# Patient Record
Sex: MALE | Race: WHITE | Employment: OTHER | URBAN - METROPOLITAN AREA
[De-identification: names, ages, dates, MRNs, and addresses within clinical notes are randomized per-mention and may not be internally consistent; named-entity substitution may affect disease eponyms.]

---

## 2021-06-20 ENCOUNTER — HOSPITAL ENCOUNTER (INPATIENT)
Age: 66
LOS: 2 days | Discharge: HOME HEALTH | DRG: 494 | End: 2021-06-22
Attending: EMERGENCY MEDICINE | Admitting: ORTHOPAEDIC SURGERY
Payer: COMMERCIAL

## 2021-06-20 ENCOUNTER — APPOINTMENT (OUTPATIENT)
Dept: GENERAL RADIOLOGY | Age: 66
DRG: 494 | End: 2021-06-20
Payer: COMMERCIAL

## 2021-06-20 ENCOUNTER — APPOINTMENT (OUTPATIENT)
Dept: CT IMAGING | Age: 66
DRG: 494 | End: 2021-06-20
Payer: COMMERCIAL

## 2021-06-20 DIAGNOSIS — S82.201A CLOSED FRACTURE OF RIGHT TIBIA AND FIBULA, INITIAL ENCOUNTER: Primary | ICD-10-CM

## 2021-06-20 DIAGNOSIS — S82.401A CLOSED FRACTURE OF RIGHT TIBIA AND FIBULA, INITIAL ENCOUNTER: Primary | ICD-10-CM

## 2021-06-20 LAB
ABO/RH: NORMAL
ANION GAP SERPL CALCULATED.3IONS-SCNC: 12 MMOL/L (ref 3–16)
ANTIBODY SCREEN: NORMAL
BASOPHILS ABSOLUTE: 0 K/UL (ref 0–0.2)
BASOPHILS RELATIVE PERCENT: 0.2 %
BUN BLDV-MCNC: 18 MG/DL (ref 7–20)
CALCIUM SERPL-MCNC: 10.1 MG/DL (ref 8.3–10.6)
CHLORIDE BLD-SCNC: 104 MMOL/L (ref 99–110)
CO2: 23 MMOL/L (ref 21–32)
CREAT SERPL-MCNC: 1 MG/DL (ref 0.8–1.3)
EOSINOPHILS ABSOLUTE: 0.1 K/UL (ref 0–0.6)
EOSINOPHILS RELATIVE PERCENT: 0.7 %
GFR AFRICAN AMERICAN: >60
GFR NON-AFRICAN AMERICAN: >60
GLUCOSE BLD-MCNC: 97 MG/DL (ref 70–99)
HCT VFR BLD CALC: 51.7 % (ref 40.5–52.5)
HEMOGLOBIN: 17.4 G/DL (ref 13.5–17.5)
LYMPHOCYTES ABSOLUTE: 1.3 K/UL (ref 1–5.1)
LYMPHOCYTES RELATIVE PERCENT: 12.2 %
MCH RBC QN AUTO: 31 PG (ref 26–34)
MCHC RBC AUTO-ENTMCNC: 33.7 G/DL (ref 31–36)
MCV RBC AUTO: 91.9 FL (ref 80–100)
MONOCYTES ABSOLUTE: 0.7 K/UL (ref 0–1.3)
MONOCYTES RELATIVE PERCENT: 6.6 %
NEUTROPHILS ABSOLUTE: 8.3 K/UL (ref 1.7–7.7)
NEUTROPHILS RELATIVE PERCENT: 80.3 %
PDW BLD-RTO: 12.6 % (ref 12.4–15.4)
PLATELET # BLD: 199 K/UL (ref 135–450)
PMV BLD AUTO: 9 FL (ref 5–10.5)
POTASSIUM REFLEX MAGNESIUM: 4 MMOL/L (ref 3.5–5.1)
RBC # BLD: 5.62 M/UL (ref 4.2–5.9)
SARS-COV-2, NAAT: NOT DETECTED
SODIUM BLD-SCNC: 139 MMOL/L (ref 136–145)
WBC # BLD: 10.3 K/UL (ref 4–11)

## 2021-06-20 PROCEDURE — 87635 SARS-COV-2 COVID-19 AMP PRB: CPT

## 2021-06-20 PROCEDURE — 6360000002 HC RX W HCPCS: Performed by: PHYSICIAN ASSISTANT

## 2021-06-20 PROCEDURE — 1200000000 HC SEMI PRIVATE

## 2021-06-20 PROCEDURE — 6370000000 HC RX 637 (ALT 250 FOR IP): Performed by: ORTHOPAEDIC SURGERY

## 2021-06-20 PROCEDURE — 99285 EMERGENCY DEPT VISIT HI MDM: CPT

## 2021-06-20 PROCEDURE — 6360000002 HC RX W HCPCS: Performed by: ORTHOPAEDIC SURGERY

## 2021-06-20 PROCEDURE — 99222 1ST HOSP IP/OBS MODERATE 55: CPT | Performed by: ORTHOPAEDIC SURGERY

## 2021-06-20 PROCEDURE — 86850 RBC ANTIBODY SCREEN: CPT

## 2021-06-20 PROCEDURE — 2580000003 HC RX 258: Performed by: ORTHOPAEDIC SURGERY

## 2021-06-20 PROCEDURE — 86901 BLOOD TYPING SEROLOGIC RH(D): CPT

## 2021-06-20 PROCEDURE — 80048 BASIC METABOLIC PNL TOTAL CA: CPT

## 2021-06-20 PROCEDURE — 36415 COLL VENOUS BLD VENIPUNCTURE: CPT

## 2021-06-20 PROCEDURE — 73700 CT LOWER EXTREMITY W/O DYE: CPT

## 2021-06-20 PROCEDURE — 86900 BLOOD TYPING SEROLOGIC ABO: CPT

## 2021-06-20 PROCEDURE — 85025 COMPLETE CBC W/AUTO DIFF WBC: CPT

## 2021-06-20 PROCEDURE — 93005 ELECTROCARDIOGRAM TRACING: CPT | Performed by: PHYSICIAN ASSISTANT

## 2021-06-20 PROCEDURE — 73590 X-RAY EXAM OF LOWER LEG: CPT

## 2021-06-20 RX ORDER — SODIUM CHLORIDE 0.9 % (FLUSH) 0.9 %
5-40 SYRINGE (ML) INJECTION EVERY 12 HOURS SCHEDULED
Status: DISCONTINUED | OUTPATIENT
Start: 2021-06-20 | End: 2021-06-21

## 2021-06-20 RX ORDER — ONDANSETRON 2 MG/ML
4 INJECTION INTRAMUSCULAR; INTRAVENOUS EVERY 6 HOURS PRN
Status: DISCONTINUED | OUTPATIENT
Start: 2021-06-20 | End: 2021-06-22 | Stop reason: HOSPADM

## 2021-06-20 RX ORDER — MORPHINE SULFATE 2 MG/ML
2 INJECTION, SOLUTION INTRAMUSCULAR; INTRAVENOUS
Status: DISCONTINUED | OUTPATIENT
Start: 2021-06-20 | End: 2021-06-21

## 2021-06-20 RX ORDER — OXYCODONE HYDROCHLORIDE 10 MG/1
10 TABLET ORAL EVERY 4 HOURS PRN
Status: DISCONTINUED | OUTPATIENT
Start: 2021-06-20 | End: 2021-06-22 | Stop reason: HOSPADM

## 2021-06-20 RX ORDER — POLYETHYLENE GLYCOL 3350 17 G/17G
17 POWDER, FOR SOLUTION ORAL DAILY PRN
Status: DISCONTINUED | OUTPATIENT
Start: 2021-06-20 | End: 2021-06-22 | Stop reason: HOSPADM

## 2021-06-20 RX ORDER — MORPHINE SULFATE 4 MG/ML
4 INJECTION, SOLUTION INTRAMUSCULAR; INTRAVENOUS
Status: DISCONTINUED | OUTPATIENT
Start: 2021-06-20 | End: 2021-06-22 | Stop reason: HOSPADM

## 2021-06-20 RX ORDER — SODIUM CHLORIDE 9 MG/ML
25 INJECTION, SOLUTION INTRAVENOUS PRN
Status: DISCONTINUED | OUTPATIENT
Start: 2021-06-20 | End: 2021-06-21

## 2021-06-20 RX ORDER — SODIUM CHLORIDE 0.9 % (FLUSH) 0.9 %
5-40 SYRINGE (ML) INJECTION PRN
Status: DISCONTINUED | OUTPATIENT
Start: 2021-06-20 | End: 2021-06-21

## 2021-06-20 RX ORDER — OXYCODONE HYDROCHLORIDE 5 MG/1
5 TABLET ORAL EVERY 4 HOURS PRN
Status: DISCONTINUED | OUTPATIENT
Start: 2021-06-20 | End: 2021-06-22 | Stop reason: HOSPADM

## 2021-06-20 RX ORDER — ACETAMINOPHEN 325 MG/1
650 TABLET ORAL EVERY 6 HOURS
Status: DISCONTINUED | OUTPATIENT
Start: 2021-06-20 | End: 2021-06-22 | Stop reason: HOSPADM

## 2021-06-20 RX ORDER — ONDANSETRON 4 MG/1
4 TABLET, ORALLY DISINTEGRATING ORAL EVERY 8 HOURS PRN
Status: DISCONTINUED | OUTPATIENT
Start: 2021-06-20 | End: 2021-06-22 | Stop reason: HOSPADM

## 2021-06-20 RX ADMIN — OXYCODONE HYDROCHLORIDE 10 MG: 10 TABLET ORAL at 16:15

## 2021-06-20 RX ADMIN — HYDROMORPHONE HYDROCHLORIDE 0.5 MG: 1 INJECTION, SOLUTION INTRAMUSCULAR; INTRAVENOUS; SUBCUTANEOUS at 14:55

## 2021-06-20 RX ADMIN — Medication 10 ML: at 23:07

## 2021-06-20 RX ADMIN — ACETAMINOPHEN 650 MG: 325 TABLET ORAL at 16:15

## 2021-06-20 RX ADMIN — ENOXAPARIN SODIUM 40 MG: 40 INJECTION SUBCUTANEOUS at 18:43

## 2021-06-20 RX ADMIN — MORPHINE SULFATE 4 MG: 4 INJECTION INTRAVENOUS at 18:47

## 2021-06-20 RX ADMIN — ACETAMINOPHEN 650 MG: 325 TABLET ORAL at 23:05

## 2021-06-20 ASSESSMENT — PAIN SCALES - GENERAL
PAINLEVEL_OUTOF10: 0
PAINLEVEL_OUTOF10: 4
PAINLEVEL_OUTOF10: 7
PAINLEVEL_OUTOF10: 7
PAINLEVEL_OUTOF10: 4
PAINLEVEL_OUTOF10: 7
PAINLEVEL_OUTOF10: 2
PAINLEVEL_OUTOF10: 4
PAINLEVEL_OUTOF10: 3

## 2021-06-20 ASSESSMENT — PAIN DESCRIPTION - DESCRIPTORS
DESCRIPTORS: ACHING
DESCRIPTORS: THROBBING;ACHING
DESCRIPTORS: ACHING
DESCRIPTORS: THROBBING;ACHING
DESCRIPTORS: ACHING;THROBBING

## 2021-06-20 ASSESSMENT — ENCOUNTER SYMPTOMS
SHORTNESS OF BREATH: 0
COUGH: 0
NAUSEA: 0
COLOR CHANGE: 0
VOMITING: 0

## 2021-06-20 ASSESSMENT — PAIN DESCRIPTION - FREQUENCY
FREQUENCY: CONTINUOUS

## 2021-06-20 ASSESSMENT — PAIN DESCRIPTION - PAIN TYPE
TYPE: ACUTE PAIN

## 2021-06-20 ASSESSMENT — PAIN DESCRIPTION - ORIENTATION
ORIENTATION: RIGHT

## 2021-06-20 ASSESSMENT — PAIN - FUNCTIONAL ASSESSMENT
PAIN_FUNCTIONAL_ASSESSMENT: PREVENTS OR INTERFERES SOME ACTIVE ACTIVITIES AND ADLS

## 2021-06-20 ASSESSMENT — PAIN DESCRIPTION - ONSET
ONSET: ON-GOING
ONSET: GRADUAL

## 2021-06-20 ASSESSMENT — PAIN DESCRIPTION - LOCATION
LOCATION: LEG

## 2021-06-20 ASSESSMENT — PAIN DESCRIPTION - PROGRESSION
CLINICAL_PROGRESSION: GRADUALLY WORSENING
CLINICAL_PROGRESSION: NOT CHANGED
CLINICAL_PROGRESSION: GRADUALLY IMPROVING
CLINICAL_PROGRESSION: NOT CHANGED
CLINICAL_PROGRESSION: GRADUALLY IMPROVING

## 2021-06-20 NOTE — ED NOTES
Bed: A15  Expected date: 6/20/21  Expected time: 9:30 AM  Means of arrival: [facility]  Comments:  66M fall, poss tib/fib fx     Sil Mccloud, RN  06/20/21 7761

## 2021-06-20 NOTE — ED PROVIDER NOTES
Pt Name: Denise Mei  MRN: [id number]  [place] 1955  Date of evaluation: 6/20/2021    EKG Interpretation    The purpose of this note is for preliminary EKG interpretation only. This patient was not seen by this provider. EKG had been ordered as part of a preoperative work-up and was handed to me for evaluation. EKG visualized preliminarily interpreted by myself shows sinus rhythm at a rate of 66 with a normal axis of 4. ST-T waves intervals all within normal limits.   Normal cardiogram.       Taty Burrows MD  06/20/21 8628

## 2021-06-20 NOTE — ED NOTES
ED SBAR report provider to [facility]. Patient to be transported to Room 3115 via stretcher by transport tech. Patient transported with bedside cardiac monitor and with IV medications infusing. IV site clean, dry, and intact. MEWS score and pain assessed as 2/10 and documented. Updated patient on plan of care.      Frederick Morrow RN  06/20/21 5782

## 2021-06-20 NOTE — ED PROVIDER NOTES
[address] [facility]        Pt Name: Kenny Joshua  MRN: [id number]  [place] 1955  Date of evaluation: 6/20/2021  Provider: ELLIOT Good  PCP: Myles Rojas  Note Started: 10:25 AM EDT       ISABELA. I have evaluated this patient. My supervising physician was available for consultation. CHIEF COMPLAINT       Chief Complaint   Patient presents with    Leg Injury     right lower leg, demority noted, slipped down a hill        HISTORY OF PRESENT ILLNESS   (Location, Timing/Onset, Context/Setting, Quality, Duration, Modifying Factors, Severity, Associated Signs and Symptoms)  Note limiting factors. Kenny Joshua is a 61 y.o. male who presents with a Chief Complaint of right leg pain. The patient was on a muddy slope, slipped and fell. He felt a crack in his right lower leg and was unable to walk due to pain. He has no numbness or tingling. He has no other injuries. He has no further complaints at this time. Nursing Notes were all reviewed and agreed with or any disagreements were addressed in the HPI. REVIEW OF SYSTEMS    (2-9 systems for level 4, 10 or more for level 5)     Review of Systems   Constitutional: Negative for chills and fever. Respiratory: Negative for cough and shortness of breath. Cardiovascular: Negative for chest pain and palpitations. Gastrointestinal: Negative for nausea and vomiting. Musculoskeletal: Positive for arthralgias and myalgias. Negative for neck pain and neck stiffness. Skin: Negative for color change and wound. Neurological: Negative for dizziness, syncope, weakness and headaches. Positives and Pertinent negatives as per HPI. Except as noted above in the ROS, all other systems were reviewed and negative. PAST MEDICAL HISTORY     Past Medical History:   Diagnosis Date    Hyperlipidemia     Hypertension          SURGICAL HISTORY   History reviewed.  No pertinent surgical history. CURRENTMEDICATIONS       Previous Medications    No medications on file         ALLERGIES     Patient has no known allergies. FAMILYHISTORY     History reviewed. No pertinent family history. SOCIAL HISTORY       Social History     Tobacco Use    Smoking status: Never Smoker    Smokeless tobacco: Never Used   Substance Use Topics    Alcohol use: Not Currently    Drug use: Never       SCREENINGS    Juan Coma Scale  Eye Opening: Spontaneous  Best Verbal Response: Oriented  Best Motor Response: Obeys commands  [place] Coma Scale Score: 15        PHYSICAL EXAM    (up to 7 for level 4, 8 or more for level 5)     ED Triage Vitals [06/20/21 0949]   BP Temp Temp Source Pulse Resp SpO2 Height Weight   126/83 98 °F (36.7 °C) Oral 71 17 100 % -- 204 lb 9.4 oz (92.8 kg)       Physical Exam  Vitals and nursing note reviewed. Constitutional:       General: He is not in acute distress. Appearance: Normal appearance. He is well-developed. He is not ill-appearing, toxic-appearing or diaphoretic. HENT:      Head: Normocephalic and atraumatic. Eyes:      Conjunctiva/sclera: Conjunctivae normal.      Pupils: Pupils are equal, round, and reactive to light. Cardiovascular:      Rate and Rhythm: Normal rate and regular rhythm. Pulses:           Dorsalis pedis pulses are 2+ on the right side and 2+ on the left side. Pulmonary:      Effort: Pulmonary effort is normal. No respiratory distress. Breath sounds: Normal breath sounds. Musculoskeletal:      Right lower leg: Swelling, deformity, tenderness and bony tenderness present. Left lower leg: Normal.      Right foot: Normal capillary refill. No swelling, deformity or tenderness. Left foot: Normal capillary refill. No swelling, deformity or tenderness. Comments: NVS intact distally   Skin:     General: Skin is warm and dry. Neurological:      General: No focal deficit present.       Mental Status: He is alert and oriented to person, place, and time. Psychiatric:         Behavior: Behavior normal. Behavior is cooperative. Thought Content: Thought content normal.         DIAGNOSTIC RESULTS   LABS:    Labs Reviewed   CBC WITH AUTO DIFFERENTIAL - Abnormal; Notable for the following components:       Result Value    Neutrophils Absolute 8.3 (*)     All other components within normal limits    Narrative:     Performed at:  [address] [address]   Phone [phone] [place]    Narrative:     Performed at:  [address] [address]   Phone [phone]   BASIC METABOLIC PANEL W/ REFLEX TO MG FOR LOW K    Narrative:     Performed at:  [address] [address]   Phone [phone]   TYPE AND SCREEN    Narrative:     Performed at:  [address] [address]   Phone [phone]       All other labs were within normal range or not returned as of this dictation. EKG: All EKG's are interpreted by the Emergency Department Physician in the absence of a cardiologist.  Please see their note for interpretation of EKG. RADIOLOGY:   Non-plain film images such as CT, Ultrasound and MRI are read by the radiologist. Plain radiographic images are visualized and preliminarily interpreted by the ED Provider with the below findings:    Interpretation per the Radiologist below, if available at the time of this note:    CT ANKLE RIGHT WO CONTRAST   Final Result   1. Acute comminuted fracture of the distal tibia centered at the distal   diaphysis with distal extension to the articular surface of the tibial   plafond and through the posterior malleolus of the tibia. The posterior   malleolus is mildly displaced. 2. Acute and comminuted fracture of the lateral malleolus. 3. Mild lateral subluxation of the talus in relation to the tibial plafond   with resultant widening of the medial clear space compatible with underlying   ligamentous injury. XR TIBIA FIBULA RIGHT (2 VIEWS)   Final Result   Oblique mildly displaced fracture distal tibial diaphysis      Probable oblique fracture distal fibula           No results found. PROCEDURES   Unless otherwise noted below, none     Procedures    CRITICAL CARE TIME   N/A    CONSULTS:  IP CONSULT TO ORTHOPEDIC SURGERY  IP CONSULT TO HOSPITALIST      EMERGENCY DEPARTMENT COURSE and DIFFERENTIAL DIAGNOSIS/MDM:   Vitals:    Vitals:    06/20/21 1431 06/20/21 1446 06/20/21 1516 06/20/21 1540   BP: 129/85 121/74 123/73 117/79   Pulse:    71   Resp:    16   Temp:    98.2 °F (36.8 °C)   TempSrc:    Oral   SpO2: 97% 100% 98% 97%   Weight:           Patient was given the following medications:  Medications   HYDROmorphone (DILAUDID) injection 0.5 mg (0.5 mg Intravenous Given 6/20/21 1455)           ED COURSE & MEDICAL DECISION MAKING    - The patient presented to the ER with complaints of right leg injury. Vital signs were reviewed. Exam with WDWN male in no apparent distress. Peripheral IV placed. Labs, Imaging ordered. - Pertinent Labs & Imaging studies reviewed. (See chart for details)   -  Patient seen and evaluated in the emergency department. -  Triage and nursing notes reviewed and incorporated. -  Old chart records reviewed and incorporated. -  ISABELA. I have evaluated this patient. My supervising physician was available for consultation.  -  Differential diagnosis includes: abrasion/laceration, contusion, fracture, sprain/strain, dislocation  -  Work-up included:  See above  -  ED treatment included:  dilaudid offered on arrival - pt declined and advised his pain was well controlled. Later, after splint was placed he was more uncomfortable so a dose of dilaudid was given.   - Consults: Orthopedics - I spoke with Dr Trinity Dukes who advised admission for surgery. I consulted the hospitalist, Dr. Kemi Goodson, who advised that given that this patient does not have any significant medical history, so from an internal medicine standpoint there is nothing for them to manage from an inpatient standpoint since the only issue is orthopedic, and asked for me to re-consult ortho for them to place admission orders. I then reached back out to orthopedics, who did place admission orders.  -  Results discussed with patient and/or family. Imaging studies show an oblique mildly displaced fracture of the distal tibial diaphysis and a probable oblique fracture of the distal fibula. Patient feels that his pain is well controlled, and is agreeable with the plan for admission, surgery. At this time, we recommend admission, as the patient requires surgical intervention. The patient and/or family is agreeable with plan of care and disposition.  -  A lower leg splint was placed by the emergency department technician, it was applied appropriately and the patient was neurovascularly intact as observed by myself. -  Disposition:   Admission  - Critical Care: 0 minutes      FINAL IMPRESSION      1. Closed fracture of right tibia and fibula, initial encounter          DISPOSITION/PLAN   DISPOSITION Admitted 06/20/2021 02:27:45 PM      PATIENT REFERRED TO:  No follow-up provider specified.     DISCHARGE MEDICATIONS:  New Prescriptions    No medications on file       DISCONTINUED MEDICATIONS:  Discontinued Medications    No medications on file              (Please note that portions of this note were completed with a voice recognition program.  Efforts were made to edit the dictations but occasionally words are mis-transcribed.)    ELLIOT Hill (electronically signed)           Sisi Cantu, [address]  06/20/21 [address] [place], [address]  06/20/21 3217

## 2021-06-20 NOTE — PROGRESS NOTES
Checking on patient Q2H for nutrition needs, hygiene needs, comfort measures, mobility, fall risk interventions, and safe environment. All precautions and interventions in place. Educated patient on use of call light and telephone. Patient verbalizes understanding. Call light/telephone in reach.   Electronically signed by Allan Stone RN on 6/20/2021 at 4:57 PM

## 2021-06-20 NOTE — PLAN OF CARE
Problem: Pain:  Goal: Pain level will decrease  Description: Pain level will decrease  Outcome: Ongoing  Note: Pain /discomfort being managed with PRN analgesics per MD orders. Patient able to express presence and absence of pain and rate pain appropriately using numerical scale. Goal: Control of acute pain  Description: Control of acute pain  Outcome: Ongoing  Goal: Control of chronic pain  Description: Control of chronic pain  Outcome: Ongoing     Problem: Falls - Risk of:  Goal: Will remain free from falls  Description: Will remain free from falls  Outcome: Ongoing  Note: Fall risk assessment completed . Fall precautions in place, bed alarm on, side rails 2/4 up, call light in reach, educated pt on calling for assistance when needed, room clear of clutter. Pt verbalized understanding.

## 2021-06-20 NOTE — H&P
Department of Orthopedic Surgery  History and Physical      CHIEF COMPLAINT: Right leg injury    Reason for Admission: Right tibia and ankle fractures    History Obtained From:  patient    HISTORY OF PRESENT ILLNESS:      The patient is a 61 y.o. male with no significant past medical history who presents today with a right lower leg injury. He was on a bed when your river and slid down some wet mud. His leg gave out under him and he heard a snap. He had immediate onset of pain and inability to bear weight. He denies other injuries. He denies prior history of injury or surgery to the right lower extremity. He is otherwise healthy and denies history of blood clots, diabetes. He is visiting from [place], [place]. He is a retired . Past Medical History: No significant history  Past Surgical History: No significant history    Medications Prior to Admission:   Not in a hospital admission. Allergies:  Patient has no known allergies. REVIEW OF SYSTEMS:  CONSTITUTIONAL:  negative  RESPIRATORY:  negative  CARDIOVASCULAR:  negative  MUSCULOSKELETAL:  positive for right leg pain  NEUROLOGICAL:  negative    PHYSICAL EXAM:  VITALS:  /79   Pulse 71   Temp 98.2 °F (36.8 °C) (Oral)   Resp 16   Wt 204 lb 9.4 oz (92.8 kg)   SpO2 97%     Appearance: lying in hospital bed, appears to be in no acute distress, awake and alert  Resp: unlabored breathing on room air  Skin: warm, dry and intact with out erythema or significant increased temperature  RLE: Below-knee splint in place. Sensation is intact to light touch to the exposed toes. He can actively flex and extend all of his toes. Brisk capillary refill in his toes. Nontender about the knee. Radiographs:  Right tibia/fibula x-rays and CT scan of the ankle reviewed and are significant for a diaphyseal fracture of the tibia as well as a bimalleolar ankle fracture involving the lateral and posterior malleolus.     ASSESSMENT AND PLAN:  Right tibia fracture with right bimalleolar ankle fracture    We discussed the diagnosis and treatment options. I recommended surgical fixation of his tibia and ankle fractures. We went over the risks and complications of surgery including: bleeding, infection, decreased ROM, continued pain, instability, fracture, dislocation, neurovascular injury, post op cognitive disorder, DVT, pulmonary embolism and need for further surgical procedures. The patient understands these issues and we will see the patient in the operating room.      Plan for surgery Monday vs Tuesday  NPO after midnight  Pain control  NWB RLE  DVT ppx with Lovenox 40mg daily    Librado Caldwell MD  6/20/2021

## 2021-06-21 ENCOUNTER — ANESTHESIA EVENT (OUTPATIENT)
Dept: OPERATING ROOM | Age: 66
DRG: 494 | End: 2021-06-21
Payer: COMMERCIAL

## 2021-06-21 ENCOUNTER — ANESTHESIA (OUTPATIENT)
Dept: OPERATING ROOM | Age: 66
DRG: 494 | End: 2021-06-21
Payer: COMMERCIAL

## 2021-06-21 ENCOUNTER — APPOINTMENT (OUTPATIENT)
Dept: GENERAL RADIOLOGY | Age: 66
DRG: 494 | End: 2021-06-21
Payer: COMMERCIAL

## 2021-06-21 VITALS
TEMPERATURE: 98.4 F | SYSTOLIC BLOOD PRESSURE: 122 MMHG | OXYGEN SATURATION: 94 % | RESPIRATION RATE: 13 BRPM | DIASTOLIC BLOOD PRESSURE: 71 MMHG

## 2021-06-21 LAB
EKG ATRIAL RATE: 66 BPM
EKG DIAGNOSIS: NORMAL
EKG P AXIS: 58 DEGREES
EKG P-R INTERVAL: 172 MS
EKG Q-T INTERVAL: 392 MS
EKG QRS DURATION: 78 MS
EKG QTC CALCULATION (BAZETT): 410 MS
EKG R AXIS: 4 DEGREES
EKG T AXIS: 30 DEGREES
EKG VENTRICULAR RATE: 66 BPM

## 2021-06-21 PROCEDURE — 6360000002 HC RX W HCPCS: Performed by: ORTHOPAEDIC SURGERY

## 2021-06-21 PROCEDURE — 27827 TREAT LOWER LEG FRACTURE: CPT | Performed by: NURSE PRACTITIONER

## 2021-06-21 PROCEDURE — 27792 TREATMENT OF ANKLE FRACTURE: CPT | Performed by: ORTHOPAEDIC SURGERY

## 2021-06-21 PROCEDURE — 3700000001 HC ADD 15 MINUTES (ANESTHESIA): Performed by: ORTHOPAEDIC SURGERY

## 2021-06-21 PROCEDURE — 94150 VITAL CAPACITY TEST: CPT

## 2021-06-21 PROCEDURE — 6370000000 HC RX 637 (ALT 250 FOR IP): Performed by: ORTHOPAEDIC SURGERY

## 2021-06-21 PROCEDURE — 6360000002 HC RX W HCPCS: Performed by: ANESTHESIOLOGY

## 2021-06-21 PROCEDURE — 73590 X-RAY EXAM OF LOWER LEG: CPT

## 2021-06-21 PROCEDURE — 0QSJ04Z REPOSITION RIGHT FIBULA WITH INTERNAL FIXATION DEVICE, OPEN APPROACH: ICD-10-PCS | Performed by: ORTHOPAEDIC SURGERY

## 2021-06-21 PROCEDURE — 7100000000 HC PACU RECOVERY - FIRST 15 MIN: Performed by: ORTHOPAEDIC SURGERY

## 2021-06-21 PROCEDURE — 2720000010 HC SURG SUPPLY STERILE: Performed by: ORTHOPAEDIC SURGERY

## 2021-06-21 PROCEDURE — 7100000001 HC PACU RECOVERY - ADDTL 15 MIN: Performed by: ORTHOPAEDIC SURGERY

## 2021-06-21 PROCEDURE — 6360000002 HC RX W HCPCS: Performed by: NURSE ANESTHETIST, CERTIFIED REGISTERED

## 2021-06-21 PROCEDURE — 3600000014 HC SURGERY LEVEL 4 ADDTL 15MIN: Performed by: ORTHOPAEDIC SURGERY

## 2021-06-21 PROCEDURE — C1713 ANCHOR/SCREW BN/BN,TIS/BN: HCPCS | Performed by: ORTHOPAEDIC SURGERY

## 2021-06-21 PROCEDURE — 2580000003 HC RX 258: Performed by: ORTHOPAEDIC SURGERY

## 2021-06-21 PROCEDURE — 3209999900 FLUORO FOR SURGICAL PROCEDURES

## 2021-06-21 PROCEDURE — 2580000003 HC RX 258: Performed by: NURSE ANESTHETIST, CERTIFIED REGISTERED

## 2021-06-21 PROCEDURE — 0QSG04Z REPOSITION RIGHT TIBIA WITH INTERNAL FIXATION DEVICE, OPEN APPROACH: ICD-10-PCS | Performed by: ORTHOPAEDIC SURGERY

## 2021-06-21 PROCEDURE — 2709999900 HC NON-CHARGEABLE SUPPLY: Performed by: ORTHOPAEDIC SURGERY

## 2021-06-21 PROCEDURE — 2500000003 HC RX 250 WO HCPCS: Performed by: ORTHOPAEDIC SURGERY

## 2021-06-21 PROCEDURE — 3600000004 HC SURGERY LEVEL 4 BASE: Performed by: ORTHOPAEDIC SURGERY

## 2021-06-21 PROCEDURE — 93010 ELECTROCARDIOGRAM REPORT: CPT | Performed by: INTERNAL MEDICINE

## 2021-06-21 PROCEDURE — 3700000000 HC ANESTHESIA ATTENDED CARE: Performed by: ORTHOPAEDIC SURGERY

## 2021-06-21 PROCEDURE — 1200000000 HC SEMI PRIVATE

## 2021-06-21 PROCEDURE — 27827 TREAT LOWER LEG FRACTURE: CPT | Performed by: ORTHOPAEDIC SURGERY

## 2021-06-21 PROCEDURE — C1769 GUIDE WIRE: HCPCS | Performed by: ORTHOPAEDIC SURGERY

## 2021-06-21 PROCEDURE — 2500000003 HC RX 250 WO HCPCS: Performed by: NURSE ANESTHETIST, CERTIFIED REGISTERED

## 2021-06-21 DEVICE — SCREW BNE L16MM DIA2.7MM LNG CORT FT ANK S STL ST: Type: IMPLANTABLE DEVICE | Site: ANKLE | Status: FUNCTIONAL

## 2021-06-21 DEVICE — PLATE BNE L80MM 4 H R LAT DST PERIARTC FIBULAR S STL LOK: Type: IMPLANTABLE DEVICE | Site: ANKLE | Status: FUNCTIONAL

## 2021-06-21 DEVICE — SCREW BNE L30MM DIA3.5MM CORT ANK S STL NONLOCKING LO PROF: Type: IMPLANTABLE DEVICE | Site: TIBIA | Status: FUNCTIONAL

## 2021-06-21 DEVICE — SCREW BONE L32MM DIA2.7MM ANK S STL LCK LO PROF FOR FX MGMT: Type: IMPLANTABLE DEVICE | Site: TIBIA | Status: FUNCTIONAL

## 2021-06-21 DEVICE — SCREW BNE L14MM DIA3.5MM HD DIA2.7MM CORT PERIARTC S STL ST: Type: IMPLANTABLE DEVICE | Site: ANKLE | Status: FUNCTIONAL

## 2021-06-21 DEVICE — SCREW BONE L40MM DIA2.7MM CORT ANK S STL NONLOCKING LO PROF: Type: IMPLANTABLE DEVICE | Site: TIBIA | Status: FUNCTIONAL

## 2021-06-21 DEVICE — IMPLANTABLE DEVICE: Type: IMPLANTABLE DEVICE | Site: TIBIA | Status: FUNCTIONAL

## 2021-06-21 DEVICE — SCREW BONE L30MM DIA2.7MM S STL LCK LO PROF FOR ANK FX MGMT: Type: IMPLANTABLE DEVICE | Site: TIBIA | Status: FUNCTIONAL

## 2021-06-21 DEVICE — SCREW BNE L26MM DIA3.5MM CORT ANK S STL NONLOCKING LO PROF: Type: IMPLANTABLE DEVICE | Site: TIBIA | Status: FUNCTIONAL

## 2021-06-21 DEVICE — SCREW BNE L24MM DIA2.7MM SM HEX HD DIA2.5MM CORT S STL ST: Type: IMPLANTABLE DEVICE | Site: ANKLE | Status: FUNCTIONAL

## 2021-06-21 DEVICE — SCREW BONE L36MM DIA2.7MM S STL CORT ANK FT ST CANN LCK FULL: Type: IMPLANTABLE DEVICE | Site: TIBIA | Status: FUNCTIONAL

## 2021-06-21 DEVICE — SCREW BNE L18MM DIA3.5MM HD DIA2.7MM PERIARTC CORT S STL ST: Type: IMPLANTABLE DEVICE | Site: ANKLE | Status: FUNCTIONAL

## 2021-06-21 DEVICE — SCREW BNE L18MM DIA2.7MM HEX HD DIA2.5MM CANC BIODUR 108C: Type: IMPLANTABLE DEVICE | Site: ANKLE | Status: FUNCTIONAL

## 2021-06-21 DEVICE — SCREW BNE L24MM DIA3.5MM CORT ANK S STL NONLOCKING LO PROF: Type: IMPLANTABLE DEVICE | Site: TIBIA | Status: FUNCTIONAL

## 2021-06-21 DEVICE — SCREW BNE L14MM DIA2.7MM HEX HD DIA2.5MM CANC BIODUR 108C: Type: IMPLANTABLE DEVICE | Site: ANKLE | Status: FUNCTIONAL

## 2021-06-21 DEVICE — SCREW BNE L12MM DIA3.5MM HD DIA2.7MM CORT PERIARTC S STL ST: Type: IMPLANTABLE DEVICE | Site: ANKLE | Status: FUNCTIONAL

## 2021-06-21 DEVICE — SCREW BNE L16MM DIA2.7MM HEX HD DIA2.5MM CANC BIODUR 108C: Type: IMPLANTABLE DEVICE | Site: ANKLE | Status: FUNCTIONAL

## 2021-06-21 DEVICE — SCREW BONE L38MM DIA2.7MM S STL CORT ANK FT ST CANN LCK FULL: Type: IMPLANTABLE DEVICE | Site: TIBIA | Status: FUNCTIONAL

## 2021-06-21 RX ORDER — DEXAMETHASONE SODIUM PHOSPHATE 4 MG/ML
INJECTION, SOLUTION INTRA-ARTICULAR; INTRALESIONAL; INTRAMUSCULAR; INTRAVENOUS; SOFT TISSUE PRN
Status: DISCONTINUED | OUTPATIENT
Start: 2021-06-21 | End: 2021-06-21 | Stop reason: SDUPTHER

## 2021-06-21 RX ORDER — SODIUM CHLORIDE 450 MG/100ML
INJECTION, SOLUTION INTRAVENOUS CONTINUOUS
Status: DISCONTINUED | OUTPATIENT
Start: 2021-06-21 | End: 2021-06-22 | Stop reason: HOSPADM

## 2021-06-21 RX ORDER — ATORVASTATIN CALCIUM 20 MG/1
20 TABLET, FILM COATED ORAL DAILY
COMMUNITY

## 2021-06-21 RX ORDER — PROPOFOL 10 MG/ML
INJECTION, EMULSION INTRAVENOUS PRN
Status: DISCONTINUED | OUTPATIENT
Start: 2021-06-21 | End: 2021-06-21 | Stop reason: SDUPTHER

## 2021-06-21 RX ORDER — SENNA AND DOCUSATE SODIUM 50; 8.6 MG/1; MG/1
1 TABLET, FILM COATED ORAL 2 TIMES DAILY
Status: DISCONTINUED | OUTPATIENT
Start: 2021-06-21 | End: 2021-06-22 | Stop reason: HOSPADM

## 2021-06-21 RX ORDER — FENTANYL CITRATE 50 UG/ML
50 INJECTION, SOLUTION INTRAMUSCULAR; INTRAVENOUS EVERY 5 MIN PRN
Status: DISCONTINUED | OUTPATIENT
Start: 2021-06-21 | End: 2021-06-21 | Stop reason: HOSPADM

## 2021-06-21 RX ORDER — SODIUM CHLORIDE 0.9 % (FLUSH) 0.9 %
5-40 SYRINGE (ML) INJECTION PRN
Status: DISCONTINUED | OUTPATIENT
Start: 2021-06-21 | End: 2021-06-22 | Stop reason: HOSPADM

## 2021-06-21 RX ORDER — FENTANYL CITRATE 50 UG/ML
INJECTION, SOLUTION INTRAMUSCULAR; INTRAVENOUS PRN
Status: DISCONTINUED | OUTPATIENT
Start: 2021-06-21 | End: 2021-06-21 | Stop reason: SDUPTHER

## 2021-06-21 RX ORDER — LIDOCAINE HYDROCHLORIDE 20 MG/ML
INJECTION, SOLUTION EPIDURAL; INFILTRATION; INTRACAUDAL; PERINEURAL PRN
Status: DISCONTINUED | OUTPATIENT
Start: 2021-06-21 | End: 2021-06-21 | Stop reason: SDUPTHER

## 2021-06-21 RX ORDER — HYDRALAZINE HYDROCHLORIDE 20 MG/ML
5 INJECTION INTRAMUSCULAR; INTRAVENOUS EVERY 10 MIN PRN
Status: DISCONTINUED | OUTPATIENT
Start: 2021-06-21 | End: 2021-06-21 | Stop reason: HOSPADM

## 2021-06-21 RX ORDER — OLMESARTAN MEDOXOMIL AND HYDROCHLOROTHIAZIDE 40/25 40; 25 MG/1; MG/1
1 TABLET ORAL DAILY
COMMUNITY

## 2021-06-21 RX ORDER — FENTANYL CITRATE 50 UG/ML
25 INJECTION, SOLUTION INTRAMUSCULAR; INTRAVENOUS EVERY 5 MIN PRN
Status: DISCONTINUED | OUTPATIENT
Start: 2021-06-21 | End: 2021-06-21 | Stop reason: HOSPADM

## 2021-06-21 RX ORDER — MULTIVIT-MIN/IRON/FOLIC ACID/K 18-600-40
1 CAPSULE ORAL DAILY
COMMUNITY

## 2021-06-21 RX ORDER — MORPHINE SULFATE 2 MG/ML
2 INJECTION, SOLUTION INTRAMUSCULAR; INTRAVENOUS
Status: DISCONTINUED | OUTPATIENT
Start: 2021-06-21 | End: 2021-06-22 | Stop reason: HOSPADM

## 2021-06-21 RX ORDER — MEPERIDINE HYDROCHLORIDE 25 MG/ML
12.5 INJECTION INTRAMUSCULAR; INTRAVENOUS; SUBCUTANEOUS
Status: DISCONTINUED | OUTPATIENT
Start: 2021-06-21 | End: 2021-06-21 | Stop reason: HOSPADM

## 2021-06-21 RX ORDER — HYDROCODONE BITARTRATE AND ACETAMINOPHEN 5; 325 MG/1; MG/1
1 TABLET ORAL PRN
Status: DISCONTINUED | OUTPATIENT
Start: 2021-06-21 | End: 2021-06-21 | Stop reason: HOSPADM

## 2021-06-21 RX ORDER — HYDROCODONE BITARTRATE AND ACETAMINOPHEN 5; 325 MG/1; MG/1
2 TABLET ORAL PRN
Status: DISCONTINUED | OUTPATIENT
Start: 2021-06-21 | End: 2021-06-21 | Stop reason: HOSPADM

## 2021-06-21 RX ORDER — BACILLUS COAGULANS/VITAMIN D3 2B-5 MCG
1 TABLET,CHEWABLE ORAL DAILY
COMMUNITY

## 2021-06-21 RX ORDER — ONDANSETRON 2 MG/ML
4 INJECTION INTRAMUSCULAR; INTRAVENOUS
Status: DISCONTINUED | OUTPATIENT
Start: 2021-06-21 | End: 2021-06-21 | Stop reason: HOSPADM

## 2021-06-21 RX ORDER — PROMETHAZINE HYDROCHLORIDE 25 MG/ML
6.25 INJECTION, SOLUTION INTRAMUSCULAR; INTRAVENOUS
Status: DISCONTINUED | OUTPATIENT
Start: 2021-06-21 | End: 2021-06-21 | Stop reason: HOSPADM

## 2021-06-21 RX ORDER — BUPIVACAINE HYDROCHLORIDE 5 MG/ML
INJECTION, SOLUTION EPIDURAL; INTRACAUDAL
Status: COMPLETED | OUTPATIENT
Start: 2021-06-21 | End: 2021-06-21

## 2021-06-21 RX ORDER — OXYCODONE HYDROCHLORIDE 10 MG/1
10 TABLET ORAL EVERY 4 HOURS PRN
Status: DISCONTINUED | OUTPATIENT
Start: 2021-06-21 | End: 2021-06-21

## 2021-06-21 RX ORDER — SODIUM CHLORIDE 9 MG/ML
25 INJECTION, SOLUTION INTRAVENOUS PRN
Status: DISCONTINUED | OUTPATIENT
Start: 2021-06-21 | End: 2021-06-22 | Stop reason: HOSPADM

## 2021-06-21 RX ORDER — SODIUM CHLORIDE 0.9 % (FLUSH) 0.9 %
5-40 SYRINGE (ML) INJECTION EVERY 12 HOURS SCHEDULED
Status: DISCONTINUED | OUTPATIENT
Start: 2021-06-21 | End: 2021-06-22 | Stop reason: HOSPADM

## 2021-06-21 RX ORDER — ONDANSETRON 2 MG/ML
INJECTION INTRAMUSCULAR; INTRAVENOUS PRN
Status: DISCONTINUED | OUTPATIENT
Start: 2021-06-21 | End: 2021-06-21 | Stop reason: SDUPTHER

## 2021-06-21 RX ORDER — ASPIRIN 81 MG/1
81 TABLET ORAL DAILY
Status: ON HOLD | COMMUNITY
End: 2021-06-22 | Stop reason: HOSPADM

## 2021-06-21 RX ORDER — MIDAZOLAM HYDROCHLORIDE 1 MG/ML
INJECTION INTRAMUSCULAR; INTRAVENOUS PRN
Status: DISCONTINUED | OUTPATIENT
Start: 2021-06-21 | End: 2021-06-21 | Stop reason: SDUPTHER

## 2021-06-21 RX ORDER — SODIUM CHLORIDE 9 MG/ML
INJECTION, SOLUTION INTRAVENOUS CONTINUOUS PRN
Status: DISCONTINUED | OUTPATIENT
Start: 2021-06-21 | End: 2021-06-21 | Stop reason: SDUPTHER

## 2021-06-21 RX ORDER — OXYCODONE HYDROCHLORIDE 5 MG/1
5 TABLET ORAL EVERY 4 HOURS PRN
Status: DISCONTINUED | OUTPATIENT
Start: 2021-06-21 | End: 2021-06-21

## 2021-06-21 RX ADMIN — ACETAMINOPHEN 650 MG: 325 TABLET ORAL at 23:27

## 2021-06-21 RX ADMIN — OXYCODONE HYDROCHLORIDE 5 MG: 5 TABLET ORAL at 23:53

## 2021-06-21 RX ADMIN — FENTANYL CITRATE 50 MCG: 50 INJECTION, SOLUTION INTRAMUSCULAR; INTRAVENOUS at 17:08

## 2021-06-21 RX ADMIN — SODIUM CHLORIDE: 4.5 INJECTION, SOLUTION INTRAVENOUS at 18:12

## 2021-06-21 RX ADMIN — MIDAZOLAM 2 MG: 1 INJECTION INTRAMUSCULAR; INTRAVENOUS at 14:53

## 2021-06-21 RX ADMIN — OXYCODONE HYDROCHLORIDE 10 MG: 10 TABLET ORAL at 03:01

## 2021-06-21 RX ADMIN — OXYCODONE HYDROCHLORIDE 10 MG: 10 TABLET ORAL at 18:18

## 2021-06-21 RX ADMIN — ACETAMINOPHEN 650 MG: 325 TABLET ORAL at 05:46

## 2021-06-21 RX ADMIN — CEFAZOLIN 2000 MG: 10 INJECTION, POWDER, FOR SOLUTION INTRAVENOUS at 23:45

## 2021-06-21 RX ADMIN — HYDROMORPHONE HYDROCHLORIDE 1 MG: 1 INJECTION, SOLUTION INTRAMUSCULAR; INTRAVENOUS; SUBCUTANEOUS at 15:03

## 2021-06-21 RX ADMIN — DEXAMETHASONE SODIUM PHOSPHATE 4 MG: 4 INJECTION, SOLUTION INTRAMUSCULAR; INTRAVENOUS at 15:00

## 2021-06-21 RX ADMIN — CEFAZOLIN 2000 MG: 10 INJECTION, POWDER, FOR SOLUTION INTRAVENOUS at 14:53

## 2021-06-21 RX ADMIN — LIDOCAINE HYDROCHLORIDE 50 MG: 20 INJECTION, SOLUTION EPIDURAL; INFILTRATION; INTRACAUDAL; PERINEURAL at 14:56

## 2021-06-21 RX ADMIN — SODIUM CHLORIDE: 9 INJECTION, SOLUTION INTRAVENOUS at 14:55

## 2021-06-21 RX ADMIN — FENTANYL CITRATE 50 MCG: 50 INJECTION INTRAMUSCULAR; INTRAVENOUS at 14:56

## 2021-06-21 RX ADMIN — DOCUSATE SODIUM 50 MG AND SENNOSIDES 8.6 MG 1 TABLET: 8.6; 5 TABLET, FILM COATED ORAL at 20:12

## 2021-06-21 RX ADMIN — PROPOFOL 200 MG: 10 INJECTION, EMULSION INTRAVENOUS at 14:58

## 2021-06-21 RX ADMIN — MORPHINE SULFATE 2 MG: 2 INJECTION, SOLUTION INTRAMUSCULAR; INTRAVENOUS at 20:12

## 2021-06-21 RX ADMIN — FENTANYL CITRATE 50 MCG: 50 INJECTION INTRAMUSCULAR; INTRAVENOUS at 15:00

## 2021-06-21 RX ADMIN — ONDANSETRON 4 MG: 2 INJECTION INTRAMUSCULAR; INTRAVENOUS at 15:00

## 2021-06-21 RX ADMIN — Medication 10 ML: at 09:16

## 2021-06-21 RX ADMIN — ASPIRIN 325 MG: 325 TABLET, COATED ORAL at 20:12

## 2021-06-21 ASSESSMENT — PAIN DESCRIPTION - PROGRESSION
CLINICAL_PROGRESSION: GRADUALLY WORSENING
CLINICAL_PROGRESSION: GRADUALLY IMPROVING
CLINICAL_PROGRESSION: GRADUALLY WORSENING
CLINICAL_PROGRESSION: NOT CHANGED
CLINICAL_PROGRESSION: GRADUALLY IMPROVING
CLINICAL_PROGRESSION: GRADUALLY IMPROVING

## 2021-06-21 ASSESSMENT — PULMONARY FUNCTION TESTS
PIF_VALUE: 13
PIF_VALUE: 15
PIF_VALUE: 4
PIF_VALUE: 18
PIF_VALUE: 14
PIF_VALUE: 14
PIF_VALUE: 13
PIF_VALUE: 14
PIF_VALUE: 14
PIF_VALUE: 15
PIF_VALUE: 14
PIF_VALUE: 15
PIF_VALUE: 15
PIF_VALUE: 13
PIF_VALUE: 14
PIF_VALUE: 14
PIF_VALUE: 15
PIF_VALUE: 4
PIF_VALUE: 2
PIF_VALUE: 15
PIF_VALUE: 14
PIF_VALUE: 15
PIF_VALUE: 15
PIF_VALUE: 14
PIF_VALUE: 4
PIF_VALUE: 17
PIF_VALUE: 14
PIF_VALUE: 14
PIF_VALUE: 13
PIF_VALUE: 15
PIF_VALUE: 12
PIF_VALUE: 14
PIF_VALUE: 16
PIF_VALUE: 15
PIF_VALUE: 14
PIF_VALUE: 0
PIF_VALUE: 14
PIF_VALUE: 14
PIF_VALUE: 12
PIF_VALUE: 16
PIF_VALUE: 14
PIF_VALUE: 14
PIF_VALUE: 15
PIF_VALUE: 13
PIF_VALUE: 16
PIF_VALUE: 4
PIF_VALUE: 14
PIF_VALUE: 15
PIF_VALUE: 15
PIF_VALUE: 14
PIF_VALUE: 15
PIF_VALUE: 18
PIF_VALUE: 14
PIF_VALUE: 14
PIF_VALUE: 13
PIF_VALUE: 0
PIF_VALUE: 0
PIF_VALUE: 14
PIF_VALUE: 14
PIF_VALUE: 9
PIF_VALUE: 4
PIF_VALUE: 14
PIF_VALUE: 13
PIF_VALUE: 15
PIF_VALUE: 14
PIF_VALUE: 14
PIF_VALUE: 13
PIF_VALUE: 14
PIF_VALUE: 0
PIF_VALUE: 14
PIF_VALUE: 15
PIF_VALUE: 13
PIF_VALUE: 14
PIF_VALUE: 18
PIF_VALUE: 16
PIF_VALUE: 14
PIF_VALUE: 17
PIF_VALUE: 14
PIF_VALUE: 13
PIF_VALUE: 0
PIF_VALUE: 15
PIF_VALUE: 5
PIF_VALUE: 1
PIF_VALUE: 14
PIF_VALUE: 14
PIF_VALUE: 12
PIF_VALUE: 1
PIF_VALUE: 14
PIF_VALUE: 16
PIF_VALUE: 13
PIF_VALUE: 14
PIF_VALUE: 15
PIF_VALUE: 14
PIF_VALUE: 15
PIF_VALUE: 16
PIF_VALUE: 4
PIF_VALUE: 4
PIF_VALUE: 13
PIF_VALUE: 14
PIF_VALUE: 1
PIF_VALUE: 0
PIF_VALUE: 0
PIF_VALUE: 13
PIF_VALUE: 14
PIF_VALUE: 17

## 2021-06-21 ASSESSMENT — PAIN DESCRIPTION - FREQUENCY
FREQUENCY: CONTINUOUS
FREQUENCY: INTERMITTENT
FREQUENCY: CONTINUOUS
FREQUENCY: CONTINUOUS

## 2021-06-21 ASSESSMENT — PAIN DESCRIPTION - PAIN TYPE
TYPE: ACUTE PAIN
TYPE: SURGICAL PAIN
TYPE: SURGICAL PAIN;ACUTE PAIN
TYPE: SURGICAL PAIN
TYPE: ACUTE PAIN

## 2021-06-21 ASSESSMENT — PAIN - FUNCTIONAL ASSESSMENT
PAIN_FUNCTIONAL_ASSESSMENT: PREVENTS OR INTERFERES SOME ACTIVE ACTIVITIES AND ADLS
PAIN_FUNCTIONAL_ASSESSMENT: 0-10
PAIN_FUNCTIONAL_ASSESSMENT: PREVENTS OR INTERFERES SOME ACTIVE ACTIVITIES AND ADLS

## 2021-06-21 ASSESSMENT — PAIN DESCRIPTION - ORIENTATION
ORIENTATION: RIGHT

## 2021-06-21 ASSESSMENT — PAIN SCALES - GENERAL
PAINLEVEL_OUTOF10: 8
PAINLEVEL_OUTOF10: 7
PAINLEVEL_OUTOF10: 2
PAINLEVEL_OUTOF10: 3
PAINLEVEL_OUTOF10: 0
PAINLEVEL_OUTOF10: 4
PAINLEVEL_OUTOF10: 4
PAINLEVEL_OUTOF10: 6
PAINLEVEL_OUTOF10: 10
PAINLEVEL_OUTOF10: 2
PAINLEVEL_OUTOF10: 5
PAINLEVEL_OUTOF10: 0
PAINLEVEL_OUTOF10: 4

## 2021-06-21 ASSESSMENT — PAIN DESCRIPTION - DESCRIPTORS
DESCRIPTORS: ACHING
DESCRIPTORS: ACHING;DISCOMFORT
DESCRIPTORS: ACHING
DESCRIPTORS: ACHING

## 2021-06-21 ASSESSMENT — PAIN DESCRIPTION - ONSET
ONSET: ON-GOING
ONSET: GRADUAL
ONSET: ON-GOING

## 2021-06-21 ASSESSMENT — PAIN DESCRIPTION - LOCATION
LOCATION: ANKLE
LOCATION: LEG
LOCATION: ANKLE
LOCATION: LEG
LOCATION: ANKLE
LOCATION: ANKLE

## 2021-06-21 ASSESSMENT — LIFESTYLE VARIABLES: SMOKING_STATUS: 0

## 2021-06-21 NOTE — PROGRESS NOTES
[facility] Orthopedic Surgery   Progress Note      S/P :  SUBJECTIVE  In bed. Alert and oriented. . Pain is   described in right lower leg and ankle and with the intensity of moderate. Pain is described as aching, throbbing. States he fell at a friends home. He lives in [place]. OBJECTIVE              Physical                      VITALS:  /74   Pulse 66   Temp 98.3 °F (36.8 °C) (Oral)   Resp 16   Ht 5' 7\" (1.702 m)   Wt 203 lb 14.8 oz (92.5 kg)   SpO2 95%   BMI 31.94 kg/m²                     MUSCULOSKELETAL:  right foot NVI. Wiggles toes to command. Pedal pulses are palpable. NEUROLOGIC:                                  Sensory:  Touch:  Right Lower Extremity:  normal                                        Right lower leg in splint with ACE. FOB elevated. Data       CBC:   Lab Results   Component Value Date    WBC 10.3 06/20/2021    RBC 5.62 06/20/2021    HGB 17.4 06/20/2021    HCT 51.7 06/20/2021    MCV 91.9 06/20/2021    MCH 31.0 06/20/2021    MCHC 33.7 06/20/2021    RDW 12.6 06/20/2021     06/20/2021    MPV 9.0 06/20/2021        WBC:    Lab Results   Component Value Date    WBC 10.3 06/20/2021        Hemoglobin/Hematocrit:    Lab Results   Component Value Date    HGB 17.4 06/20/2021    HCT 51.7 06/20/2021        PT/INR:  No results found for: PROTIME, INR       Narrative   EXAMINATION:   CT OF THE RIGHT ANKLE WITHOUT CONTRAST 6/20/2021 12:29 pm       TECHNIQUE:   CT of the right ankle was performed without the administration of intravenous   contrast.  Multiplanar reformatted images are provided for review.  Dose   modulation, iterative reconstruction, and/or weight based adjustment of the   mA/kV was utilized to reduce the radiation dose to as low as reasonably   achievable.       COMPARISON:   Right tibia and fibula radiograph June 20, 2021       HISTORY   ORDERING SYSTEM PROVIDED HISTORY: eval for fracture extension   TECHNOLOGIST PROVIDED HISTORY:   Reason for exam:->eval for fracture extension   Decision Support Exception - unselect if not a suspected or confirmed   emergency medical condition->Emergency Medical Condition (MA)   Reason for Exam: eval for fracture extension   Acuity: Acute   Type of Exam: Initial       FINDINGS:   Bones: Acute and mildly comminuted fracture of the right tibia centered at   the distal diaphysis with approximately 1/2 shaft width displacement of the   major distal fracture fragments.  The fracture line extends distally along   the posterior cortex to involve the posterior malleolus and articular surface   of the tibial plafond.  Acute and comminuted fracture of the lateral   malleolus with fracture lines extending distal to the tibial plafond.  Small   corticated ossicle adjacent to the medial malleolus consistent with sequela   of remote trauma.       Soft Tissue:  Soft tissue edema about the ankle and extending into the foot. No radiopaque foreign bodies are identified.  No subcutaneous gas.       Joint:  Very mild lateral subluxation of the talus in relation to the tibial   plafond and with resultant mild widening of the medial clear space.  Moderate   tibiotalar effusion.           Impression   1. Acute comminuted fracture of the distal tibia centered at the distal   diaphysis with distal extension to the articular surface of the tibial   plafond and through the posterior malleolus of the tibia.  The posterior   malleolus is mildly displaced. 2. Acute and comminuted fracture of the lateral malleolus.    3. Mild lateral subluxation of the talus in relation to the tibial plafond   with resultant widening of the medial clear space compatible with underlying   ligamentous injury.                 Current Inpatient Medications             Current Facility-Administered Medications: sodium chloride flush 0.9 % injection 5-40 mL, 5-40 mL, Intravenous, 2 times per day  sodium chloride flush 0.9 % injection 5-40 mL, 5-40 mL, Intravenous, PRN  0.9 % sodium chloride infusion, 25 mL, Intravenous, PRN  ondansetron (ZOFRAN-ODT) disintegrating tablet 4 mg, 4 mg, Oral, Q8H PRN **OR** ondansetron (ZOFRAN) injection 4 mg, 4 mg, Intravenous, Q6H PRN  polyethylene glycol (GLYCOLAX) packet 17 g, 17 g, Oral, Daily PRN  enoxaparin (LOVENOX) injection 40 mg, 40 mg, Subcutaneous, Daily  acetaminophen (TYLENOL) tablet 650 mg, 650 mg, Oral, Q6H  oxyCODONE (ROXICODONE) immediate release tablet 5 mg, 5 mg, Oral, Q4H PRN **OR** oxyCODONE HCl (OXY-IR) immediate release tablet 10 mg, 10 mg, Oral, Q4H PRN  morphine (PF) injection 2 mg, 2 mg, Intravenous, Q2H PRN **OR** morphine (PF) injection 4 mg, 4 mg, Intravenous, Q2H PRN    ASSESSMENT AND PLAN    Fall  Right ankle pain  Right tibial fx  Right bimalleolar ankle fx  NPO for OR today with Dr Don Harry.  Pt states he talked to Dr Don Harry at bedside this AM      Stanton Mendez, RADHA - CNP  6/21/2021  9:44 AM

## 2021-06-21 NOTE — CARE COORDINATION
DISCHARGE PLAN: Pt plans to have a friend drive him home to [place]. Sx today. Will await PT/OT recommendations. Meds to beds. ___________________________________    Met w/pt to address barriers to dc. HOME: Pt reported that he resides alone in a single family home in [place]. There are 2 DUSTY. Pt reported that he was in [place] visiting a friend when he was injured and had to come to the hospital for treatment. Disease Specific: Rt tibial fx, rt bimalleolar ankle fx    COVID Vaccination: No    DME/O2: No DME PTA-Will follow to assist with post surgical DME needs. ACTIVE SERVICES: Pt reported that he was independent with all self care PTA. Pt stated that he was visiting a friend in [place] and plans to d/c to his friends home [address]). Pt stated that he has friends that are driving to [place] to pick him up to transport him home and to drive his truck home. Pt stated that he has a lot of support at home and has had many friends that have stepped up to offer help and support. Pt stated that despite living alone, he will have ample assistance upon return home. TRANSPORTATION: Pt is an active  and stated that friends from [place] will be driving to [address] [place] [address] to drive him home. PHARMACY: Denies difficulty obtaining/taking meds. Pt typically uses [facility] in [place], [place] but has requested Meds to [facility] if able. PCP: Sarahi Reyes    DEMOGRAPHICS: Verified address/phone number as correct    INSURANCE:  [address] [place] [address]    HD/PD: No      THERAPY RECS: Will await post surgical recommendations. Discharge planning team will remain available for needs. Please consult for any specifics not addressed in this note.     Heather Chavez, [place]  [phone]  Electronically signed by Jade Molina on 6/21/2021 at 11:54 AM

## 2021-06-21 NOTE — ACP (ADVANCE CARE PLANNING)
Advance Care Planning     Advance Care Planning Activator (Inpatient)  Conversation Note      Date of ACP Conversation: 6/21/2021     Conversation Conducted with: Patient with Decision Making Capacity    ACP Activator: [address] Decision Maker:     Current Designated Health Care Decision Maker: Today we documented Decision Maker(s) consistent with Legal Next of Kin hierarchy.     1.) Ardhamateusz Duncansil ([facility]) [phone]  2.) [facility]) [phone]    Care Preferences    Ventilation: \"If you were in your present state of health and suddenly became very ill and were unable to breathe on your own, what would your preference be about the use of a ventilator (breathing machine) if it were available to you? \"      Would the patient desire the use of ventilator (breathing machine)?: yes    \"If your health worsens and it becomes clear that your chance of recovery is unlikely, what would your preference be about the use of a ventilator (breathing machine) if it were available to you? \"     Would the patient desire the use of ventilator (breathing machine)?: \"I would leave that decision to my children\". Resuscitation  \"CPR works best to restart the heart when there is a sudden event, like a heart attack, in someone who is otherwise healthy. Unfortunately, CPR does not typically restart the heart for people who have serious health conditions or who are very sick. \"    \"In the event your heart stopped as a result of an underlying serious health condition, would you want attempts to be made to restart your heart (answer \"yes\" for attempt to resuscitate) or would you prefer a natural death (answer \"no\" for do not attempt to resuscitate)? \" yes       [] Yes   [x] No   Educated Patient / [place] Blank regarding differences between Advance Directives and portable DNR orders.     Length of ACP Conversation in minutes:  5    Conversation Outcomes:  [x] ACP discussion completed  [] Existing advance directive reviewed with patient; no changes to patient's previously recorded wishes  [] New Advance Directive completed  [] Portable Do Not Rescitate prepared for Provider review and signature  [] POLST/POST/MOLST/MOST prepared for Provider review and signature      Follow-up plan:    [] Schedule follow-up conversation to continue planning  [] Referred individual to Provider for additional questions/concerns   [] Advised patient/agent/surrogate to review completed ACP document and update if needed with changes in condition, patient preferences or care setting    [x] This note routed to one or more involved healthcare providers  Electronically signed by Breonna Arguello on 6/21/2021 at 11:58 AM

## 2021-06-21 NOTE — ANESTHESIA POSTPROCEDURE EVALUATION
Department of Anesthesiology  Postprocedure Note    Patient: Silviano Mccauley  MRN: [id number]  YOB: 1955  Date of evaluation: 6/21/2021  Time:  5:56 PM     Procedure Summary     Date: 06/21/21 Room / Location: [address]    Anesthesia Start: 3844 Anesthesia Stop: 1226    Procedure: OPEN REDUCTION INTERNAL FIXATION right distal tibia fibula fracture (Right Ankle) Diagnosis: (tibia and fibula fracture)    Surgeons: Rola Falcon MD Responsible Provider: Yuli Wagner MD    Anesthesia Type: general ASA Status: 2          Anesthesia Type: general    Maria M Phase I: Maria M Score: 8    Maria M Phase II:      Last vitals: Reviewed and per EMR flowsheets.        Anesthesia Post Evaluation    Patient location during evaluation: PACU  Patient participation: complete - patient participated  Level of consciousness: awake and alert  Pain score: 4  Airway patency: patent  Nausea & Vomiting: no nausea and no vomiting  Complications: no  Cardiovascular status: blood pressure returned to baseline  Respiratory status: acceptable  Hydration status: euvolemic

## 2021-06-21 NOTE — PROGRESS NOTES
Patient returned to [phone] from PACU. R leg splinted and ace wrapped. C/o R leg pain at an 8, see eMAR for prn medication given. Patient able to wiggle toes, and sensation is intact. Brisk cap refill. Dinner ordered. No other needs voiced. Fall precautions in place. Call light within reach. Will continue to monitor.     Electronically signed by Tamara Escobedo RN on 6/21/2021 at 6:34 PM

## 2021-06-21 NOTE — PROGRESS NOTES
Medication Reconciliation    List of medications for Cesar Ozuna is currently taking is complete. Source of information:   Epic records  Conversation with patient, with prompting  Conversation with [employer] in [place], [place] ([phone])     Allergies  Allergy list not thoroughly reviewed with patient at this time  Allergies listed in Epic as follows: Patient has no known allergies.      Notes regarding home medications:   Patient stated that he takes atorvastatin, olmesartan-HCTZ, aspirin, probiotic gummies, and vitamin D  Confirmed atorvastatin and olmesartan-HCTZ doses with [facility]    Mancil Mini, [facility], PharmD   6/21/2021 11:07 AM

## 2021-06-21 NOTE — ANESTHESIA PRE PROCEDURE
[facility] Department of Anesthesiology  Pre-Anesthesia Evaluation/Consultation       Name:  Erwin Joyce  : 1955  Age:  61 y.o. MRN:  [id number]  Date: 2021           Surgeon: Surgeon(s):  Ankur Nagel MD    Procedure: Procedure(s):  OPEN REDUCTION INTERNAL FIXATION right distal tibi fibula fracture     No Known Allergies  There is no problem list on file for this patient. Past Medical History:   Diagnosis Date    Hyperlipidemia     Hypertension      History reviewed. No pertinent surgical history. Social History     Tobacco Use    Smoking status: Never Smoker    Smokeless tobacco: Never Used   Substance Use Topics    Alcohol use: Not Currently    Drug use: Never     Medications  No current facility-administered medications on file prior to encounter.      Current Outpatient Medications on File Prior to Encounter   Medication Sig Dispense Refill    aspirin 81 MG EC tablet Take 81 mg by mouth daily      Probiotic Product CHEW Take 1 each by mouth daily      Cholecalciferol (VITAMIN D) 50 MCG (2000) CAPS capsule Take 1 capsule by mouth daily      atorvastatin (LIPITOR) 20 MG tablet Take 20 mg by mouth daily      olmesartan-hydroCHLOROthiazide (BENICAR HCT) 40-25 MG per tablet Take 1 tablet by mouth daily       Current Facility-Administered Medications   Medication Dose Route Frequency Provider Last Rate Last Admin    sodium chloride flush 0.9 % injection 5-40 mL  5-40 mL Intravenous 2 times per day Julio Ch MD   10 mL at 21 0916    sodium chloride flush 0.9 % injection 5-40 mL  5-40 mL Intravenous PRN Julio Ch MD        0.9 % sodium chloride infusion  25 mL Intravenous PRN Julio Ch MD        ondansetron (ZOFRAN-ODT) disintegrating tablet 4 mg  4 mg Oral Q8H PRN Julio Ch MD        Or    ondansetron [facility]) injection 4 mg  4 mg Intravenous Q6H PRN Julio hC MD        polyethylene glycol [facility]) packet 17 g  17 g Oral Daily PRN Michael Krueger MD        enoxaparin (LOVENOX) injection 40 mg  40 mg Subcutaneous Daily Michael Krueger MD   40 mg at 21 1843    acetaminophen (TYLENOL) tablet 650 mg  650 mg Oral Q6H Michael Krueger MD   650 mg at 21 0546    oxyCODONE (ROXICODONE) immediate release tablet 5 mg  5 mg Oral Q4H PRN Michael Krueger MD        Or   [facility] oxyCODONE HCl (OXY-IR) immediate release tablet 10 mg  10 mg Oral Q4H PRN Michael Krueger MD   10 mg at 21 0301    morphine (PF) injection 2 mg  2 mg Intravenous Q2H PRN Michael Krueger MD        Or    morphine (PF) injection 4 mg  4 mg Intravenous Q2H PRN Michael Krueger MD   4 mg at 21 1847     Vital Signs (Current)   Vitals:    21 0025 21 0414 21 0737 21 1229   BP: 121/74 121/73 121/74 132/80   Pulse: 70 68 66 77   Resp: 14 16 16 16   Temp: 98.6 °F (37 °C) 98.5 °F (36.9 °C) 98.3 °F (36.8 °C) 98.7 °F (37.1 °C)   TempSrc: Oral Axillary Oral Temporal   SpO2: 95% 96% 95% 95%   Weight:  203 lb 14.8 oz (92.5 kg)     Height:                                              BP Readings from Last 3 Encounters:   21 132/80     Vital Signs Statistics (for past 48 hrs)     Temp  Av.4 °F (36.9 °C)  Min: 98 °F (36.7 °C)   Min taken time: 21 0949  Max: 98.7 °F (37.1 °C)   Max taken time: 21 1229  Pulse  Av.4  Min: 77   Min taken time: 21 0737  Max: [de-identified]   Max taken time: 21 1609  Resp  Avg: 15.7  Min: 12   Min taken time: 21 1609  Max: 18   Max taken time: 21 1036  BP  Min: 109/86   Min taken time: 21 1306  Max: 152/76   Max taken time: 21 1609  MAP (mmHg)  Av.5  Min: 80   Min taken time: 21 1446  Max: 110   Max taken time: 21 1416  SpO2  Av.2 %  Min: 93 %   Min taken time: 21 1331  Max: 100 %   Max taken time: 21 1446  BP Readings from Last 3 Encounters:   21 132/80       BMI  Body mass index is 31.94 kg/m².   Estimated body mass index is 31.94 kg/m² as calculated from the following:    Height as of this encounter: 5' 7\" (1.702 m). Weight as of this encounter: 203 lb 14.8 oz (92.5 kg).     CBC   Lab Results   Component Value Date    WBC 10.3 06/20/2021    RBC 5.62 06/20/2021    HGB 17.4 06/20/2021    HCT 51.7 06/20/2021    MCV 91.9 06/20/2021    RDW 12.6 06/20/2021     06/20/2021     CMP    Lab Results   Component Value Date     06/20/2021    K 4.0 06/20/2021     06/20/2021    CO2 23 06/20/2021    BUN 18 06/20/2021    CREATININE 1.0 06/20/2021    GFRAA >60 06/20/2021    LABGLOM >60 06/20/2021    GLUCOSE 97 06/20/2021    CALCIUM 10.1 06/20/2021     BMP    Lab Results   Component Value Date     06/20/2021    K 4.0 06/20/2021     06/20/2021    CO2 23 06/20/2021    BUN 18 06/20/2021    CREATININE 1.0 06/20/2021    CALCIUM 10.1 06/20/2021    GFRAA >60 06/20/2021    LABGLOM >60 06/20/2021    GLUCOSE 97 06/20/2021     POCGlucose  Recent Labs     06/20/21  1415   GLUCOSE 97      Coags  No results found for: PROTIME, INR, APTT  HCG (If Applicable) No results found for: PREGTESTUR, PREGSERUM, HCG, HCGQUANT   ABGs No results found for: PHART, PO2ART, ZOA9QPC, KNP6WWJ, BEART, A4PUJGXV   Type & Screen (If Applicable)  No results found for: LABABO, LABRH                         BMI: Wt Readings from Last 3 Encounters:       NPO Status:   Date of last liquid consumption: 06/20/21   Time of last liquid consumption: 2300   Date of last solid food consumption: 06/20/21      Time of last solid consumption: 2030       Anesthesia Evaluation  Patient summary reviewed no history of anesthetic complications:   Airway: Mallampati: II  TM distance: >3 FB   Neck ROM: full  Mouth opening: > = 3 FB Dental: normal exam         Pulmonary: breath sounds clear to auscultation      (-) COPD and not a current smoker                           Cardiovascular:  Exercise tolerance: good (>4 METS),   (+) hypertension:, hyperlipidemia    (-) past MI and angina        Rate: normal                    Neuro/Psych:      (-) seizures, TIA and CVA           GI/Hepatic/Renal:        (-) GERD and PUD       Endo/Other:        (-) diabetes mellitus, hypothyroidism               Abdominal:           Vascular:     - DVT and PE. Anesthesia Plan      general     ASA 2       Induction: intravenous. MIPS: Postoperative opioids intended and Prophylactic antiemetics administered. Anesthetic plan and risks discussed with patient. Plan discussed with CRNA. This pre-anesthesia assessment may be used as a history and physical.    DOS STAFF ADDENDUM:    Pt seen and examined, chart reviewed (including anesthesia, drug and allergy history). No interval changes to history and physical examination. Anesthetic plan, risks, benefits, alternatives, and personnel involved discussed with patient. Patient verbalized an understanding and agrees to proceed.       Keesha Ewing MD  June 21, 2021  12:52 PM

## 2021-06-21 NOTE — PROGRESS NOTES
Pt to PACU. BP elevated. Wakes to verbal stimuli and is rolling around in pain. Will treat with prn pain med, see mar. Denies nausea. RLE elevated. Ace D&I. Ice pack applied. IV site WDL.

## 2021-06-21 NOTE — PROGRESS NOTES
Pt A&OOx4. Morning meds held d/t patient being NPO for surgery. Assessment complete. R lower extremity splinted and ace wrapped. Responds to command, able to wiggle toes. Sensation intact. Denies pain at this time. Consent signed and placed in chart at this time. No needs voiced. Fall precautions in place. Call light within reach. Will continue to monitor.     Electronically signed by Madai Brown RN on 6/21/2021 at 9:42 AM

## 2021-06-21 NOTE — BRIEF OP NOTE
Brief Postoperative Note      Patient: Denise Mei  YOB: 1955  MRN: [id number]    Date of Procedure: 6/21/2021    Pre-Op Diagnosis: Right distal tibia and fibula fracture    Post-Op Diagnosis: Same       Procedure(s):  OPEN REDUCTION INTERNAL FIXATION right distal tibia fibula fracture    Surgeon(s):  Ray Duran MD    Assistant: Alexandria White CNP. Anesthesia: General    Estimated Blood Loss (mL): Minimal    Complications: None    Specimens:   * No specimens in log *    Implants:  Implant Name Type Inv. Item Serial No.  Lot No. LRB No. Used Action   SCREW BNE L12MM DIA3. 5MM HD DIA2.7MM BINH PERIARTC S STL ST  SCREW BNE L12MM DIA3. 5MM HD DIA2.7MM BIHN PERIARTC S STL ST  [employer]WD  Right 1 Implanted   SCREW BNE L14MM DIA3. 5MM HD DIA2.7MM BINH PERIARTC S STL ST  SCREW BNE L14MM DIA3. 5MM HD DIA2.7MM BINH PERIARTC S STL ST  [employer]WD  Right 1 Implanted   SCREW BNE L18MM DIA3. 5MM HD DIA2.7MM PERIARTC BINH S STL ST  SCREW BNE L18MM DIA3. 5MM HD DIA2.7MM PERIARTC BINH S STL ST  [employer]WD  Right 1 Implanted   SCREW BNE L16MM DIA2. 7MM LNG BINH FT ANK S STL ST  SCREW BNE L16MM DIA2. 7MM LNG BINH FT ANK S STL ST  [employer]WD  Right 1 Implanted   SCREW BNE L24MM DIA2.7MM SM HEX HD DIA2.5MM BINH S STL ST  SCREW BNE L24MM DIA2.7MM SM HEX HD DIA2.5MM BINH S STL ST  [employer]WD  Right 1 Implanted   PLATE BNE [id number] 4 H R LAT DST PERIARTC FIBULAR S STL ALE  PLATE BNE [id number] 4 H R LAT DST PERIARTC FIBULAR S STL ALE  [employer]WD  Right 1 Implanted   SCREW BNE L14MM DIA2.7MM HEX HD DIA2.5MM CANC BIODUR 108C  SCREW BNE L14MM DIA2.7MM HEX HD DIA2.5MM CANC BIODUR 108C  [employer]WD  Right 1 Implanted   SCREW BNE L16MM DIA2.7MM HEX HD DIA2.5MM CANC BIODUR 108C  SCREW BNE L16MM DIA2.7MM HEX HD DIA2.5MM CANC BIODUR 108C  [employer]WD  Right 1 Implanted   SCREW BNE L18MM DIA2.7MM HEX HD DIA2.5MM CANC BIODUR 108C  SCREW BNE L18MM DIA2.7MM HEX HD DIA2.5MM CANC BIODUR 108C  [employer]-WD  Right 2 Implanted         Drains: * No LDAs found *    Findings: Same    Electronically signed by Hiren Romo MD on 6/21/2021 at 4:52 PM

## 2021-06-22 VITALS
DIASTOLIC BLOOD PRESSURE: 81 MMHG | OXYGEN SATURATION: 94 % | RESPIRATION RATE: 14 BRPM | HEIGHT: 67 IN | SYSTOLIC BLOOD PRESSURE: 149 MMHG | TEMPERATURE: 98.1 F | BODY MASS INDEX: 32.73 KG/M2 | HEART RATE: 78 BPM | WEIGHT: 208.56 LBS

## 2021-06-22 LAB
HCT VFR BLD CALC: 42.4 % (ref 40.5–52.5)
HEMOGLOBIN: 14.5 G/DL (ref 13.5–17.5)

## 2021-06-22 PROCEDURE — 97166 OT EVAL MOD COMPLEX 45 MIN: CPT

## 2021-06-22 PROCEDURE — 97530 THERAPEUTIC ACTIVITIES: CPT

## 2021-06-22 PROCEDURE — 6370000000 HC RX 637 (ALT 250 FOR IP): Performed by: ORTHOPAEDIC SURGERY

## 2021-06-22 PROCEDURE — 85014 HEMATOCRIT: CPT

## 2021-06-22 PROCEDURE — 97116 GAIT TRAINING THERAPY: CPT

## 2021-06-22 PROCEDURE — 6360000002 HC RX W HCPCS: Performed by: ORTHOPAEDIC SURGERY

## 2021-06-22 PROCEDURE — 36415 COLL VENOUS BLD VENIPUNCTURE: CPT

## 2021-06-22 PROCEDURE — 2580000003 HC RX 258: Performed by: ORTHOPAEDIC SURGERY

## 2021-06-22 PROCEDURE — 97535 SELF CARE MNGMENT TRAINING: CPT

## 2021-06-22 PROCEDURE — 85018 HEMOGLOBIN: CPT

## 2021-06-22 PROCEDURE — 97162 PT EVAL MOD COMPLEX 30 MIN: CPT

## 2021-06-22 RX ORDER — OXYCODONE HYDROCHLORIDE 5 MG/1
5-10 TABLET ORAL EVERY 6 HOURS PRN
Qty: 40 TABLET | Refills: 0 | Status: SHIPPED | OUTPATIENT
Start: 2021-06-22 | End: 2021-06-27

## 2021-06-22 RX ADMIN — OXYCODONE HYDROCHLORIDE 10 MG: 10 TABLET ORAL at 14:34

## 2021-06-22 RX ADMIN — CEFAZOLIN 2000 MG: 10 INJECTION, POWDER, FOR SOLUTION INTRAVENOUS at 06:30

## 2021-06-22 RX ADMIN — SODIUM CHLORIDE: 4.5 INJECTION, SOLUTION INTRAVENOUS at 06:28

## 2021-06-22 RX ADMIN — ACETAMINOPHEN 650 MG: 325 TABLET ORAL at 10:00

## 2021-06-22 RX ADMIN — ACETAMINOPHEN 650 MG: 325 TABLET ORAL at 14:35

## 2021-06-22 RX ADMIN — Medication 10 ML: at 10:03

## 2021-06-22 RX ADMIN — ACETAMINOPHEN 650 MG: 325 TABLET ORAL at 05:24

## 2021-06-22 RX ADMIN — OXYCODONE HYDROCHLORIDE 10 MG: 10 TABLET ORAL at 09:59

## 2021-06-22 RX ADMIN — ASPIRIN 325 MG: 325 TABLET, COATED ORAL at 10:00

## 2021-06-22 RX ADMIN — DOCUSATE SODIUM 50 MG AND SENNOSIDES 8.6 MG 1 TABLET: 8.6; 5 TABLET, FILM COATED ORAL at 09:59

## 2021-06-22 RX ADMIN — ENOXAPARIN SODIUM 40 MG: 40 INJECTION SUBCUTANEOUS at 10:00

## 2021-06-22 ASSESSMENT — PAIN SCALES - GENERAL
PAINLEVEL_OUTOF10: 0
PAINLEVEL_OUTOF10: 4
PAINLEVEL_OUTOF10: 0
PAINLEVEL_OUTOF10: 0
PAINLEVEL_OUTOF10: 7
PAINLEVEL_OUTOF10: 0
PAINLEVEL_OUTOF10: 7
PAINLEVEL_OUTOF10: 0

## 2021-06-22 NOTE — CARE COORDINATION
DISCHARGE SUMMARY     DATE OF DISCHARGE: 06/22/2021    DISCHARGE DESTINATION: Home in [address]    Phone Number: [phone]  Fax Number:  [phone]    DME: walker provided by [facility]    TRANSPORTATION: Friend to transport pt back to his home. COMMENTS: Pt has a f/u appointment scheduled with Dr. Kristen Andrade in [facility]. Pt is aware, all questions answered.   Nolberto [place], [place]  [phone]  Electronically signed by María Elena Urrutia on 6/22/2021 at 11:46 AM

## 2021-06-22 NOTE — PLAN OF CARE
Problem: Pain:  Goal: Pain level will decrease  Outcome: Ongoing     Problem: Pain:  Goal: Control of acute pain  Outcome: Ongoing     Problem: Pain:  Goal: Control of chronic pain  Outcome: Ongoing     Problem: Falls - Risk of:  Goal: Will remain free from falls  Outcome: Ongoing

## 2021-06-22 NOTE — CARE COORDINATION
Discharge Planning:  SW met with pt to continue discussing d/c plans. Pt stated that he would like to have his follow up in [place] as [place] not [place]. Pt stated that he would like to see if Dr. Sean Shaw (orthopeadic) accepts his insurance as this is the doctor that pts PCP has recommended. SW contacted the office of Dr. Sean Shaw and spoke with Bright Campos. Bright Campos stated that this office does accept pts insurance and they typically like to see their pts 10 days post op. An appointment was scheduled with Dr. Efren Pressley for 7/01/21 at [address] [facility] at [address] [facility] [address]. Pt will likely no return home until Friday but per PT/OT, pt would benefit from home PT/OT and a wheeled walker. Pt has no agency preference for [facility] and is agreeable to [facility] services. AMINA spoke with the office of Dr. Efren Pressley who gave this SW the name and number for [facility]. AMINA contacted Luba at [facility] who stated that this agency will be able to accept this pt for [facility] services. AMINA faxed the referral to [facility] at [phone]. AMINA contacted Yuli Dumas with [facility] to inform her that this pt is in need of a wheeled walker.   Elva Rod [facility]  [phone]  Electronically signed by Daxa Pacheco on 6/22/2021 at 11:44 AM

## 2021-06-22 NOTE — OP NOTE
[address] [facility]                                OPERATIVE REPORT    PATIENT NAME: Carole Hawkins                   :        1955  MED REC NO:   [id number]                          ROOM:       [id number]  ACCOUNT NO:   [de-identified]                           ADMIT DATE: 2021  PROVIDER:     Brittaney Vera MD      DATE OF PROCEDURE:  2021    PRIMARY CARE PROVIDER:  Beni Tinajero MD    PREOPERATIVE DIAGNOSES:  1. Right distal tibia pilon displaced fracture. 2.  Right distal fibula comminuted displaced fracture. POSTOPERATIVE DIAGNOSES:  1. Right distal tibia pilon displaced fracture. 2.  Right distal fibula comminuted displaced fracture. OPERATION PERFORMED:  1. Open treatment of right distal tibia pilon fracture with open  reduction and internal fixation of the tibia. 2.  Open treatment of lateral malleolus comminuted displaced fracture  with open reduction and internal fixation. SURGEON:  Brittaney Vera MD    ASSISTANT:  Mayte Ervin CNP    ANESTHESIA:  General anesthesia. ESTIMATED BLOOD LOSS:  Minimal.    COMPLICATIONS:  None. TOURNIQUET:  Right upper thigh, 300 mmHg. IMPLANT USED:  1. Arthrex eight-hole distal tibia medial locking plate and two lag  screws. 2.  Nissa distal fibula locking plate and one lag screw. INDICATIONS:  This is a 29-year-old white male who lives in [place]  but was here visiting, coming down a wet slope and sustaining a fall  with a twisting injury to his right ankle. He felt a snap with sharp  pain and deformity. He was then brought to [facility] ER where he was  found to have a distal tibia as well as fibula comminuted displaced  fracture. He was admitted to the hospital for surgical treatment. All  risks, benefits and alternatives were discussed with the patient, and he  agreed to proceed with surgical treatment.     Given the patient's Body mass index is 32.66 kg/m². and the comminuted fracture added significant challenge to the procedure. It required significant physical and mental effort. It required 60% more time for such procedure. OPERATIVE PROCEDURE:  The patient's right ankle was marked. He received  2 gm of Ancef IV preoperatively. The patient was then brought to the  operating room, underwent general anesthesia. A well-padded tourniquet  was placed, right upper thigh. The right lower extremity was then  prepped and draped in regular sterile routine fashion. A time-out was  called confirming the patient's name, site and procedure. Esmarch was used for exsanguination. Tourniquet was inflated to 300  mmHg. A medial incision was made over the distal tibia. The fracture  was then exposed, found to be markedly displaced with significant  displacement. We carefully were able to manipulate the fracture using a  bone clamp and traction. We were able to reduce it anatomically. At  this point, we placed two lag screws from anterior to posterior to  stabilize the fracture provisionally. While maintaining the reduction, we elected to use an eight-hole Arthrex  medial locking plate. We put the plate in the subcutaneous plane  proximally and then distally we passed the plate in the subcutaneous  plane. After we confirmed that the plate was in good position, we made  a small stab incision distally from which we exposed the distal hole. We used K-wire to keep it in the right position. After we confirmed  that the plate was in good position in both AP and lateral planes, we  placed one screw in the oblong hole in the shaft and then distally we  placed one 2.7 lag screw as well. That buttressed the plate in the  right position. Proximally, we made another small stab incision through  which we placed three cortical screws. Distally, we placed multiple 2.7  locking screws.   Overall, we were very satisfied with the anatomic  reduction 06/21/2021 20:17:53       T: 06/22/2021 2:19:25     [email or web address]  Job#: [id number]     Doc#: [id number]    CC:

## 2021-06-22 NOTE — PROGRESS NOTES
Data- discharge order received, pt verbalized agreement to discharge, needs for [facility] with [facility].  for OT/PT/VN and/or new Durable Medical Equipment through [facility]  for frontwheel walker , RAH reviewed and signed by MD, to be completed by RN. Action- AVS prepared, discharge instructions prepared and given to pt. , medication information packet given r/t NEW or CHANGED prescriptions, pt verbalized understanding further self-review. D/C instruction summary: Diet- regular  Activity- up with walker no weight bearing RLE , follow up with Primary Care Physician Conchita Jordan [phone] appointment pt. Advised to make appointment , immunizations reviewed and up to date , medications prescriptions to be filled at [facility] . Inpatient surgical procedural reviewed: ORIF right ankle . Contact information provided to above agencies used. Response- Case Management/ reported faxing completed RAH and AVS to needed HHC/DME services stated above. Pt belongings gathered, IV removed, pt dressed with assistance . Disposition is home with HHC/DME as stated above, transported with friend , taken to lobby via w/c with friend , no complications.

## 2021-06-22 NOTE — CARE COORDINATION
David[facility] received referral from  for [facility]. Received PT notes and DME Orders. Will verify patient's insurance and follow up with patient to deliver the ordered item(s) prior to discharge. Thank you for the referral.  Electronically signed by Indio Francis on 6/22/2021 at 12:03 PM  Cell ph# [phone]    UPDATE 6/22/21 at 12:22 pm [address] delivered requested 2-Wheeled Arvil Hays to patient and reviewed insurance coverage and equipment set up with patient and spouse. Notified RN.

## 2021-06-22 NOTE — PROGRESS NOTES
Clinical Pharmacy Note  Medication Counseling    Reviewed new medications started during hospital admission: aspirin 325 BID, oxycodone. Indications and side effects were emphasized during counseling. All medication-related questions addressed. Patient verbalized understanding of education. Should the patient express any additional questions or concerns regarding their medications, please do not hesitate to contact the pharmacy department.     Danya Osman [facility], PharmD 6/22/2021 10:09 AM

## 2021-06-22 NOTE — PROGRESS NOTES
Occupational Therapy   Occupational Therapy Initial Assessment  Date: 2021   Patient Name: Randy Rios  MRN: [id number]     : 1955    Date of Service: 2021    Assessment: Pt is 61 y.o. M who presents s/p mechanical fall resulting in closed displaced pilon fracture of R LE and R bimalleolar ankle fracture. Pt is s/p ORIF of R ankle and is NWBing in splint. PTA pt lives in [place] in one story home alone with 2 DUSTY. Pt reports independence in self-care tasks, homemaking responsibilities, and functional mobility with no device. Pt plans to stay with a friend until Thursday and then plans to go back to [place]. Currently, pt presents with ROM/strength in [facility] for self-care and transfer. Pt completed bed mobility with SPV and sit <> stand transfers with SBA/CGA. Pt completed functional mobility with RW with SBA/CGA. Pt does well maintaining NWBing. Pt a bit anxious about information and requires cues and increased time to problem solve/process. Anticipate pt to require min A for ADL needs upon discharge. Anticipate pt safe to d/c home with initial 24/7 supervision/assist and home health OT. Discharge Recommendations:  24 hour supervision or assist, Home with Home health OT  OT Equipment Recommendations  Equipment Needed: Yes  Other: Pt will require RW for safe mobility at this time. Pt plans to obtain wheelchair once home. HOME HEALTH CARE: LEVEL 1 STANDARD    - Initial home health evaluation to occur within 24-48 hours, in patient home   - Therapy to evaluate with goal of regaining prior level of functioning   - Therapy to evaluate if patient has [id number] needs for personal care    Assessment   Performance deficits / Impairments: Decreased functional mobility ; Decreased balance;Decreased ADL status; Decreased endurance;Decreased strength  Assessment: Pt is 61 y.o. M who presents s/p mechanical fall resulting in closed displaced pilon fracture of R LE and R bimalleolar ankle fracture.  Pt is s/p ORIF of R ankle and is NWBing in splint. PTA pt lives in [place] in one story home alone with 2 DUSTY. Pt reports independence in self-care tasks, homemaking responsibilities, and functional mobility with no device. Pt plans to stay with a friend until Thursday and then plans to go back to [place]. Currently, pt presents with ROM/strength in [facility] for self-care and transfer. Pt completed bed mobility with SPV and sit <> stand transfers with SBA/CGA. Pt completed functional mobility with RW with SBA/CGA. Pt does well maintaining NWBing. Pt a bit anxious about information and requires cues and increased time to problem solve/process. Anticipate pt to require min A for ADL needs upon discharge. Anticipate pt safe to d/c home with initial 24/7 supervision/assist and home health OT. Prognosis: Good  Decision Making: Medium Complexity  History: PMH: Hyperlipidemia, hypertension  Exam: ADLs, transfers, func mob, bed mob  Assistance / Modification: SBA/CGA for mobility, min A for ADLs  OT Education: OT Role;Plan of Care;Precautions; ADL Adaptive Strategies;Transfer Training  Patient Education: Increased time spent educating patient on all equipment needs, managing self-care tasks/IADL tasks  REQUIRES OT FOLLOW UP: Yes  Activity Tolerance  Activity Tolerance: Patient Tolerated treatment well  Activity Tolerance: Limited slightly by anxiety, provided increased cues and education  Safety Devices  Safety Devices in place: Yes (NICOLLE Abdullahi) notified)  Type of devices: Gait belt;Call light within reach; Left in chair;Chair alarm in place;Nurse notified           Patient Diagnosis(es): The encounter diagnosis was Closed fracture of right tibia and fibula, initial encounter. has a past medical history of Hyperlipidemia and Hypertension. has a past surgical history that includes Ankle fracture surgery (Right, 6/21/2021).            Restrictions  Restrictions/Precautions  Restrictions/Precautions: Fall Risk, Weight Bearing  Lower Extremity Weight Bearing Restrictions  Right Lower Extremity Weight Bearing: Non Weight Bearing    Subjective   General  Chart Reviewed: Yes  Patient assessed for rehabilitation services?: Yes  Additional Pertinent Hx: Pt is 61 y.o. M who presents s/p mechanical fall resulting in closed displaced pilon fracture of R LE and R bimalleolar ankle fracture. Pt is s/p ORIF of R ankle and is NWBing in splint. PMH: Hyperlipidemia, hypertension  Family / Caregiver Present: Yes [facility])  Referring Practitioner: Abdirizak Newman MD  Diagnosis: Closed displaced pilon fracture of R LE and R bimalleolar ankle fracture  Subjective  Subjective: Pt met bedside, agreeable for therapy evaluation and OOB activity. Pt very thankful for therapy to assist in discharge planning. Pt appears a bit anxious. Patient Currently in Pain:  (reporting min/moderate pain)  Pain Assessment  Pain Assessment: 0-10  Pain Level: 7  Vital Signs  Temp: 98.1 °F (36.7 °C)  Temp Source: Oral  Pulse: 78  Heart Rate Source: Monitor  Resp: 14  BP: (!) 149/81  BP Location: Right Arm  MAP (mmHg): 103  Level of Consciousness: Alert (0)  MEWS Score: 0  Patient Currently in Pain:  (reporting min/moderate pain)  Oxygen Therapy  SpO2: 94 %  O2 Device: None (Room air)     Social/Functional History  Social/Functional History  Lives With: Alone  Type of Home: House  Home Layout: One level  Home Access: Stairs to enter with rails  Entrance Stairs - Number of Steps: 2 DUSTY  Entrance Stairs - Rails: Both  Bathroom Shower/Tub: Walk-in shower  Home Equipment:  (no DME)  ADL Assistance: Independent  Homemaking Assistance: Independent  Ambulation Assistance: Independent  Transfer Assistance: Independent  Active : Yes  Occupation: Retired  Type of occupation:   Leisure & Hobbies: Dance instructor, 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 Friends house in [place] - no DUSTY. Objective   Vision: Within Functional Limits  Hearing: Within functional limits      Orientation  Overall Orientation Status: Within Functional Limits     Balance  Sitting Balance: Independent  Standing Balance:  (SBA/CGA at RW)  Standing Balance  Time: ~2 minutes  Activity: Func mob, transfers    Functional Mobility  Functional - Mobility Device: Rolling Walker  Activity: Other  Assist Level:  (SBA/CGA)  Functional Mobility Comments: Pt completed functional mobility with RW with SBA/CGA, a bit shaky mostly d/t anxiety. Toilet Transfers  Toilet - Technique: Ambulating (RW)  Equipment Used: Grab bars (Comfort height commode)  Toilet Transfer: Stand by assistance;Contact guard assistance  Toilet Transfers Comments: Max cues    ADL  Toileting:  (Completed dry transfer - declined need to void)  Additional Comments: PTA pt reports independence in self-care tasks. Anticipate pt to require min A for LB ADLs, SBA for UB ADL, and min A for toileting.      Tone RUE  RUE Tone: Normotonic  Tone LUE  LUE Tone: Normotonic  Coordination  Movements Are Fluid And Coordinated: Yes     Bed mobility  Supine to Sit: Supervision  Sit to Supine: Unable to assess (in recliner at end of session)  Scooting: Supervision     Transfers  Sit to stand: Stand by assistance;Contact guard assistance  Stand to sit: Stand by assistance;Contact guard assistance  Transfer Comments: SBA/CGA for sit <> stand from EOB to RW and sit back to recliner chair, cues for safe hand placement and NWBing precautions     Cognition  Overall Cognitive Status: WFL        Sensation  Overall Sensation Status: WFL        LUE AROM (degrees)  LUE AROM : WFL  Left Hand AROM (degrees)  Left Hand AROM: WFL  RUE AROM (degrees)  RUE AROM : WFL  Right Hand AROM (degrees)  Right Hand AROM: WFL     LUE Strength  Gross LUE Strength: WFL  RUE Strength  Gross RUE Strength: WFL          Plan   Plan  Plan Comment: Pt plans to d/c home with initial 24/7 supervision/assist and home care upon return to [place]. AM-PAC Score    Chioma Fam scored a 19/24 on the AM-PAC ADL Inpatient form. Current research shows that an AM-PAC score of 18 or greater is typically associated with a discharge to the patient's home setting. Based on the patient's AM-PAC score, and their current ADL deficits, it is recommended that the patient have 2-3 sessions per week of Occupational Therapy at d/c to increase the patient's independence. At this time, this patient demonstrates the endurance and safety to discharge home with home health OT (home vs OP services) and a follow up treatment frequency of 2-3x/wk. Please see assessment section for further patient specific details. If patient discharges prior to next session this note will serve as a discharge summary. Please see below for the latest assessment towards goals. AM-PAC Inpatient Daily Activity Raw Score: 19 (06/22/21 1146)  AM-PAC Inpatient ADL T-Scale Score : 40.22 (06/22/21 1146)  ADL Inpatient CMS 0-100% Score: 42.8 (06/22/21 1146)  ADL Inpatient CMS G-Code Modifier : CK (06/22/21 1146)    Goals  Short term goals  Time Frame for Short term goals: Pt plans to discharge home this date with initial 24/7 supervision/assist and home health OT. Therapy Time   Individual Concurrent Group Co-treatment   Time In       1040   Time Out       1140   Minutes       60   Timed Code Treatment Minutes: 45 Minutes (15 min eval)     If pt is discharged prior to next OT session, this note will serve as the discharge summary.     Monica Mirza, SANYAU/S#[id number]

## 2021-06-22 NOTE — PROGRESS NOTES
Physical Therapy    Facility/Department: [facility]  Initial Assessment    NAME: Denise Mei  : 1955  MRN: [id number]    Date of Service: 2021    Discharge Recommendations:  24 hour supervision or assist, Patient would benefit from continued therapy after discharge, S Level 1   PT Equipment Recommendations  Equipment Needed: Yes  Mobility Devices: Curvin Jose Juan: Rolling  Denise Mei scored a 19/24 on the AM-PAC short mobility form. Current research shows that an AM-PAC score of 18 or greater is typically associated with a discharge to the patient's home setting. Based on the patient's AM-PAC score and their current functional mobility deficits, it is recommended that the patient have 2-3 sessions per week of Physical Therapy at d/c to increase the patient's independence. At this time, this patient demonstrates the endurance and safety to discharge home with HHPT and a follow up treatment frequency of 2-3x/wk. Please see assessment section for further patient specific details. If patient discharges prior to next session this note will serve as a discharge summary. Please see below for the latest assessment towards goals. HOME HEALTH CARE: LEVEL 1 STANDARD     -Initial home health evaluation to occur within 24-48 hours, in patient home    -Home health agency to establish plan of care for patient over 60 day period    -Medication Reconciliation    -PCP Visit scheduled within seven days of discharge    -PT/OT to evaluate with goal of regaining prior level of functioning    -OT to evaluate if patient has [address] needs for personal care     Denise Mei was evaluated today and a DME order was entered for a wheeled walker because he requires this to successfully complete daily living tasks of ambulating.   A wheeled walker is necessary due to the patient's unsteady gait, upper body weakness, and inability to  an ambulation device; and he can ambulate only by pushing a walker instead of a lesser assistive device such as a cane, crutch, or standard walker. The need for this equipment was discussed with the patient and he understands and is in agreement. Assessment   Body structures, Functions, Activity limitations: Decreased functional mobility ; Decreased strength;Decreased ADL status; Decreased balance; Increased pain  Assessment: Pt is a 61 y.o. male who presented to the ED on 6/20/21 s/p fall down [facility], sustained right distal tib/fib fx. Pt underwent OPEN REDUCTION INTERNAL FIXATION right distal tibia fibula fracture on 6/21/21. Prior to admission, pt living independently in house setting - very active. Pt currently functioning below baseline - limited by NWB to RLE. Recommend initial 24hr supv and level 1 HHPT. He will need a RW at discharge. Treatment Diagnosis: impaired mobility  Prognosis: Good  Decision Making: Medium Complexity  History: see below  Exam: see below  Clinical Presentation: evolving  PT Education: PT Role;Plan of Care;Goals;Transfer Training;Gait Training;Functional Mobility Training;Weight-bearing Education;General Safety;Equipment  REQUIRES PT FOLLOW UP: Yes  Activity Tolerance  Activity Tolerance: Patient Tolerated treatment well       Patient Diagnosis(es): The encounter diagnosis was Closed fracture of right tibia and fibula, initial encounter. has a past medical history of Hyperlipidemia and Hypertension. has a past surgical history that includes Ankle fracture surgery (Right, 6/21/2021). Restrictions  Restrictions/Precautions  Restrictions/Precautions: Fall Risk, Weight Bearing  Lower Extremity Weight Bearing Restrictions  Right Lower Extremity Weight Bearing: Non Weight Bearing      Subjective  General  Chart Reviewed: Yes  Patient assessed for rehabilitation services?: Yes  Additional Pertinent Hx: Pt is a 61 y.o. male who presented to the ED on 6/20/21  s/p fall down [facility], sustained right distal tib/fib fx.  Pt underwent OPEN REDUCTION INTERNAL FIXATION right distal tibia fibula fracture on 6/21/21. Response To Previous Treatment: Not applicable  Family / Caregiver Present: No  Referring Practitioner: Dr. Feli Avila  Referral Date : 06/21/21  Diagnosis: ORIF R tib/fib  Follows Commands: Within Functional Limits  Subjective  Subjective: Pt is agreeable to PT  Pain Screening  Patient Currently in Pain:  (reporting min/moderate pain)          Orientation  Orientation  Overall Orientation Status: Within Functional Limits     Social/Functional History  Social/Functional History  Lives With: Alone  Type of Home: House  Home Layout: One level  Home Access: Stairs to enter with rails  Entrance Stairs - Number of Steps: 2 DUSTY  Entrance Stairs - Rails: Both  Bathroom Shower/Tub: Walk-in shower  Home Equipment:  (no DME)  ADL Assistance: Independent  Homemaking Assistance: Independent  Ambulation Assistance: Independent  Transfer Assistance: Independent  Active : Yes  Occupation: Retired  Type of occupation:   Leisure & Hobbies: Dance instructor, 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 Friends house in [facility] - no DUSTY.      Cognition   Cognition  Overall Cognitive Status: WFL    Objective  Strength RLE  Strength RLE: Exception  Comment: hip and knee WFL; ankle and foot n/a  Strength LLE  Strength LLE: WFL  Motor Control  Gross Motor?: WFL     Bed mobility  Supine to Sit: Supervision  Sit to Supine: Unable to assess (in recliner at end of session)  Scooting: Supervision  Transfers  Sit to Stand: Stand by assistance;Contact guard assistance  Stand to sit: Stand by assistance;Contact guard assistance  Ambulation  Ambulation?: Yes  Ambulation 1  Surface: level tile  Device: Rolling Walker  Assistance: Stand by assistance;Contact guard assistance  Quality of Gait: hop-to gait pattern  Gait Deviations: Slow Yaima;Decreased step length;Decreased step height  Distance: 10' + 30' x 2  Comments: steady , but anxious, throughout  Stairs/Curb  Stairs?: Yes  Stairs  # Steps : 1  Stairs Height: 6\"  Rails: None  Device: Rolling walker  Assistance: Contact guard assistance  Comment: attempted to go up 2nd step backwards but unable - pt instructed on chair method. Pt did not want to attempt steps with crutch and HR because he does not want to purchase crutches. Balance  Posture: Good  Sitting - Static: Good  Sitting - Dynamic: Good  Standing - Static: Good (@RW)  Standing - Dynamic: Fair;+ (@RW)        Plan   Plan  Times per week: daily  Current Treatment Recommendations: Strengthening, Functional Mobility Training, Transfer Training, Balance Training, Stair training, Gait Training, Patient/Caregiver Education & Training, Safety Education & Training, Neuromuscular Re-education  Safety Devices  Type of devices:  All fall risk precautions in place, Call light within reach, Gait belt, Patient at risk for falls, Left in chair, Chair alarm in place, Nurse notified      AM-PAC Score  AM-PAC Inpatient Mobility Raw Score : 19 (06/22/21 1147)  -PAC Inpatient T-Scale Score : 45.44 (06/22/21 1147)  Mobility Inpatient CMS 0-100% Score: 41.77 (06/22/21 1147)  Mobility Inpatient CMS G-Code Modifier : CK (06/22/21 1147)          Goals  Short term goals  Time Frame for Short term goals: by acute discharge  Short term goal 1: bed mobility independently  Short term goal 2: sit<>Stand mod I  Short term goal 3: ambulate > 36' mod I with RW  Short term goal 4: ascend/descend 2 steps with RW and CGA  Patient Goals   Patient goals : none stated       Therapy Time   Individual Concurrent Group Co-treatment   Time In 1040         Time Out 1140         Minutes 60         Timed Code Treatment Minutes: 45 Minutes       Mary Beth Lizama, PT

## 2021-06-22 NOTE — PROGRESS NOTES
Pt rounded frequently post-op for VS q4h, hygiene needs, comfort measures, fall precaution, and safe environment. Fall precautions and interventions in place. Educated patient on use of call light and telephone. Patient verbalizes understanding. Call light/telephone within reach. IVF infusing, PRNs given for comfort. Education given regarding pain management and NWB on RLE.

## 2021-07-01 ENCOUNTER — TELEPHONE (OUTPATIENT)
Dept: ORTHOPEDIC SURGERY | Age: 66
End: 2021-07-01

## 2021-07-01 NOTE — TELEPHONE ENCOUNTER
Parker Estes from [facility] is going to be doing an additional surgery on this patient and is asking for a return call that explains what CPT  Were used for the SX that Dr Montana Harrell performed so they know what they should use for theirs. Please call her back at [phone].

## 2021-07-01 NOTE — TELEPHONE ENCOUNTER
Attempted to reach Rachel Peck with [facility]. No answer, unable to leave message. The CPT code used is listed below.        24611 CPT for ORIF right distal tibia fibula fracture

## 2021-07-01 NOTE — TELEPHONE ENCOUNTER
Spoke with Bethany Spear and she requested the billing departments phone number. This was provided. She had no further questions.
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
## (undated) DEVICE — BIT DRL DIA2.5MM LNG GRAD FOR ANK FRAC MGMT SYS

## (undated) DEVICE — DECANTER BAG 9": Brand: MEDLINE INDUSTRIES, INC.

## (undated) DEVICE — BIT DRL DIA3.5MM TRIM-IT

## (undated) DEVICE — GLOVE SURG SZ 65 L12IN FNGR THK79MIL GRN LTX FREE

## (undated) DEVICE — BIT DRL DIA2.5MM FOR ANK FRAC MGMT SYS

## (undated) DEVICE — GLOVE SURG SZ 8 CRM LTX FREE POLYISOPRENE POLYMER BEAD ANTI

## (undated) DEVICE — GLOVE SURG SZ 65 THK91MIL LTX FREE SYN POLYISOPRENE

## (undated) DEVICE — BANDAGE COMPR W6INXL12FT SMOOTH FOR LIMB EXSANG ESMARCH

## (undated) DEVICE — SUTURE MCRYL SZ 4-0 L18IN ABSRB UD L19MM PS-2 3/8 CIR PRIM Y496G

## (undated) DEVICE — Device

## (undated) DEVICE — GUIDEWIRE ORTH L150MM DIA0.053IN W/ TRCR TIP FOR ANK FRAC

## (undated) DEVICE — SUTURE VCRL SZ 2-0 L18IN ABSRB UD CT-1 L36MM 1/2 CIR J839D

## (undated) DEVICE — 3M™ STERI-STRIP™ COMPOUND BENZOIN TINCTURE 40 BAGS/CARTON 4 CARTONS/CASE C1544: Brand: 3M™ STERI-STRIP™

## (undated) DEVICE — BIT DRL DIA2MM STD QUIK CONN FOR PERIARTC LOK PLT SYS

## (undated) DEVICE — SUTURE VCRL SZ 3-0 L18IN ABSRB UD L26MM SH 1/2 CIR J864D

## (undated) DEVICE — STRIP,CLOSURE,WOUND,MEDI-STRIP,1/2X4: Brand: MEDLINE

## (undated) DEVICE — DRESSING,GAUZE,XEROFORM,CURAD,1"X8",ST: Brand: CURAD

## (undated) DEVICE — SOLUTION IV IRRIG POUR BRL 0.9% SODIUM CHL 2F7124

## (undated) DEVICE — LOWER EXTREMITY: Brand: MEDLINE INDUSTRIES, INC.

## (undated) DEVICE — NEEDLE HYPO 22GA L1 1/2IN PIVOTING SHLD FOR LUERLOCK SYR

## (undated) DEVICE — GLOVE SURG 9 PF CRM STRL SENSICARE PI MIC LF

## (undated) DEVICE — TOWEL,OR,DSP,ST,BLUE,STD,4/PK,20PK/CS: Brand: MEDLINE

## (undated) DEVICE — COTTON UNDERCAST PADDING,CRIMPED FINISH: Brand: WEBRIL

## (undated) DEVICE — C-ARM: Brand: UNBRANDED

## (undated) DEVICE — GOWN SIRUS NONREIN XL W/TWL: Brand: MEDLINE INDUSTRIES, INC.